# Patient Record
Sex: MALE | Race: WHITE | NOT HISPANIC OR LATINO | Employment: FULL TIME | URBAN - METROPOLITAN AREA
[De-identification: names, ages, dates, MRNs, and addresses within clinical notes are randomized per-mention and may not be internally consistent; named-entity substitution may affect disease eponyms.]

---

## 2017-02-09 ENCOUNTER — OFFICE VISIT - HEALTHEAST (OUTPATIENT)
Dept: FAMILY MEDICINE | Facility: CLINIC | Age: 42
End: 2017-02-09

## 2017-02-09 ENCOUNTER — RECORDS - HEALTHEAST (OUTPATIENT)
Dept: GENERAL RADIOLOGY | Facility: CLINIC | Age: 42
End: 2017-02-09

## 2017-02-09 DIAGNOSIS — M25.522 LEFT ELBOW PAIN: ICD-10-CM

## 2017-02-09 DIAGNOSIS — M62.838 MUSCLE SPASM OF LEFT SHOULDER: ICD-10-CM

## 2017-02-09 DIAGNOSIS — M25.522 PAIN IN LEFT ELBOW: ICD-10-CM

## 2017-05-11 ENCOUNTER — COMMUNICATION - HEALTHEAST (OUTPATIENT)
Dept: FAMILY MEDICINE | Facility: CLINIC | Age: 42
End: 2017-05-11

## 2017-05-19 ENCOUNTER — COMMUNICATION - HEALTHEAST (OUTPATIENT)
Dept: FAMILY MEDICINE | Facility: CLINIC | Age: 42
End: 2017-05-19

## 2017-07-21 ENCOUNTER — COMMUNICATION - HEALTHEAST (OUTPATIENT)
Dept: FAMILY MEDICINE | Facility: CLINIC | Age: 42
End: 2017-07-21

## 2017-07-28 ENCOUNTER — COMMUNICATION - HEALTHEAST (OUTPATIENT)
Dept: FAMILY MEDICINE | Facility: CLINIC | Age: 42
End: 2017-07-28

## 2019-02-15 ENCOUNTER — OFFICE VISIT (OUTPATIENT)
Dept: FAMILY MEDICINE | Facility: CLINIC | Age: 44
End: 2019-02-15
Payer: COMMERCIAL

## 2019-02-15 VITALS
HEART RATE: 79 BPM | WEIGHT: 315 LBS | OXYGEN SATURATION: 94 % | HEIGHT: 77 IN | SYSTOLIC BLOOD PRESSURE: 126 MMHG | BODY MASS INDEX: 37.19 KG/M2 | TEMPERATURE: 98 F | RESPIRATION RATE: 18 BRPM | DIASTOLIC BLOOD PRESSURE: 88 MMHG

## 2019-02-15 DIAGNOSIS — Z11.4 SCREENING FOR HIV (HUMAN IMMUNODEFICIENCY VIRUS): ICD-10-CM

## 2019-02-15 DIAGNOSIS — Z23 NEED FOR PROPHYLACTIC VACCINATION AND INOCULATION AGAINST INFLUENZA: ICD-10-CM

## 2019-02-15 DIAGNOSIS — Z23 NEED FOR TETANUS, DIPHTHERIA, AND ACELLULAR PERTUSSIS (TDAP) VACCINE: ICD-10-CM

## 2019-02-15 DIAGNOSIS — Z00.00 ROUTINE GENERAL MEDICAL EXAMINATION AT A HEALTH CARE FACILITY: Primary | ICD-10-CM

## 2019-02-15 DIAGNOSIS — R06.83 SNORING: ICD-10-CM

## 2019-02-15 LAB
ANION GAP SERPL CALCULATED.3IONS-SCNC: 5 MMOL/L (ref 3–14)
BUN SERPL-MCNC: 19 MG/DL (ref 7–30)
CALCIUM SERPL-MCNC: 9.1 MG/DL (ref 8.5–10.1)
CHLORIDE SERPL-SCNC: 105 MMOL/L (ref 94–109)
CHOLEST SERPL-MCNC: 253 MG/DL
CO2 SERPL-SCNC: 26 MMOL/L (ref 20–32)
CREAT SERPL-MCNC: 0.82 MG/DL (ref 0.66–1.25)
GFR SERPL CREATININE-BSD FRML MDRD: >90 ML/MIN/{1.73_M2}
GLUCOSE SERPL-MCNC: 83 MG/DL (ref 70–99)
HDLC SERPL-MCNC: 46 MG/DL
LDLC SERPL CALC-MCNC: 179 MG/DL
NONHDLC SERPL-MCNC: 207 MG/DL
POTASSIUM SERPL-SCNC: 3.6 MMOL/L (ref 3.4–5.3)
SODIUM SERPL-SCNC: 136 MMOL/L (ref 133–144)
TRIGL SERPL-MCNC: 142 MG/DL

## 2019-02-15 PROCEDURE — 80048 BASIC METABOLIC PNL TOTAL CA: CPT | Performed by: NURSE PRACTITIONER

## 2019-02-15 PROCEDURE — 99396 PREV VISIT EST AGE 40-64: CPT | Performed by: NURSE PRACTITIONER

## 2019-02-15 PROCEDURE — 87389 HIV-1 AG W/HIV-1&-2 AB AG IA: CPT | Performed by: NURSE PRACTITIONER

## 2019-02-15 PROCEDURE — 90471 IMMUNIZATION ADMIN: CPT | Performed by: NURSE PRACTITIONER

## 2019-02-15 PROCEDURE — 36415 COLL VENOUS BLD VENIPUNCTURE: CPT | Performed by: NURSE PRACTITIONER

## 2019-02-15 PROCEDURE — 80061 LIPID PANEL: CPT | Performed by: NURSE PRACTITIONER

## 2019-02-15 PROCEDURE — 90715 TDAP VACCINE 7 YRS/> IM: CPT | Performed by: NURSE PRACTITIONER

## 2019-02-15 ASSESSMENT — MIFFLIN-ST. JEOR: SCORE: 2464.46

## 2019-02-15 NOTE — PROGRESS NOTES
"SUBJECTIVE:   CC: Juan Alberto Menchaca is an 44 year old male who presents for preventative health visit.     Physical     {Add if <65 person on Medicare  - Required Questions (Optional):724357}      {additional problems to add (Optional):630809}    Today's PHQ-2 Score:   PHQ-2 ( 1999 Pfizer) 2/15/2019   Q1: Little interest or pleasure in doing things 0   Q2: Feeling down, depressed or hopeless 0   PHQ-2 Score 0   PHQ-2 Score Incomplete       Abuse: Current or Past(Physical, Sexual or Emotional)- No  Do you feel safe in your environment? Yes    Social History     Tobacco Use     Smoking status: Former Smoker     Smokeless tobacco: Never Used   Substance Use Topics     Alcohol use: Yes     No flowsheet data found.{add AUDIT responses (Optional) (A score of 7 for adult men is an indication of hazardous drinking; a score of 8 or more is an indication of an alcohol use disorder.  A score of 7 or more for adult women is an indication of hazardous drinking or an alchohol use disorder):258503}    Last PSA: No results found for: PSA    Reviewed orders with patient. Reviewed health maintenance and updated orders accordingly - Yes  Current Outpatient Medications   Medication Sig Dispense Refill     ranitidine (ZANTAC) 150 MG tablet Take 150 mg by mouth At Bedtime       NO ACTIVE MEDICATIONS        No Known Allergies    Reviewed and updated as needed this visit by clinical staff  Tobacco  Allergies  Med Hx  Surg Hx  Fam Hx  Soc Hx        Reviewed and updated as needed this visit by Provider        {HISTORY OPTIONS (Optional):072910}    Review of Systems  {MALE ROS (Optional):093369::\"CONSTITUTIONAL: NEGATIVE for fever, chills, change in weight\",\"INTEGUMENTARY/SKIN: NEGATIVE for worrisome rashes, moles or lesions\",\"EYES: NEGATIVE for vision changes or irritation\",\"ENT: NEGATIVE for ear, mouth and throat problems\",\"RESP: NEGATIVE for significant cough or SOB\",\"CV: NEGATIVE for chest pain, palpitations or peripheral edema\",\"GI: " "NEGATIVE for nausea, abdominal pain, heartburn, or change in bowel habits\",\" male: negative for dysuria, hematuria, decreased urinary stream, erectile dysfunction, urethral discharge\",\"MUSCULOSKELETAL: NEGATIVE for significant arthralgias or myalgia\",\"NEURO: NEGATIVE for weakness, dizziness or paresthesias\",\"PSYCHIATRIC: NEGATIVE for changes in mood or affect\"}    OBJECTIVE:   /88 (BP Location: Right arm, Patient Position: Sitting, Cuff Size: Adult Large)   Pulse 79   Temp 98  F (36.7  C) (Oral)   Resp 18   Ht 1.949 m (6' 4.75\")   Wt 145.6 kg (321 lb)   SpO2 94%   BMI 38.31 kg/m      Physical Exam  {Exam Choices (Optional):257527}    Diagnostic Test Results:  none     ASSESSMENT/PLAN:   {Diag Picklist:567717}    COUNSELING:   {MALE COUNSELING MESSAGES:728357::\"Reviewed preventive health counseling, as reflected in patient instructions\"}    BP Readings from Last 1 Encounters:   02/15/19 126/88     Estimated body mass index is 38.31 kg/m  as calculated from the following:    Height as of this encounter: 1.949 m (6' 4.75\").    Weight as of this encounter: 145.6 kg (321 lb).    {BP Counseling- Complete if BP >= 120/80  (Optional):368549}  {Weight Management Plan (ACO) Complete if BMI is abnormal-  Ages 18-64  BMI >24.9.  Age 65+ with BMI <23 or >30 (Optional):004243}     reports that he has quit smoking. he has never used smokeless tobacco.  {Tobacco Cessation -- Complete if patient is a smoker (Optional):876839}    Counseling Resources:  ATP IV Guidelines  Pooled Cohorts Equation Calculator  FRAX Risk Assessment  ICSI Preventive Guidelines  Dietary Guidelines for Americans, 2010  USDA's MyPlate  ASA Prophylaxis  Lung CA Screening    YORDY Anand [facility]  "

## 2019-02-15 NOTE — PROGRESS NOTES
"  SUBJECTIVE:   CC: Juan Alberto Menchaca is an 44 year old male who presents for preventive health visit.     Healthy Habits:    Do you get at least three servings of calcium containing foods daily (dairy, green leafy vegetables, etc.)? { :388527::\"yes\"}    Amount of exercise or daily activities, outside of work: { :906546}    Problems taking medications regularly { :385411::\"No\"}    Medication side effects: { :081119::\"No\"}    Have you had an eye exam in the past two years? { :650735}    Do you see a dentist twice per year? { :051967}    Do you have sleep apnea, excessive snoring or daytime drowsiness?{ :111527}  {Outside tests to abstract? :022532}    {additional problems to add (Optional):037200}    Today's PHQ-2 Score: No flowsheet data found.  {PHQ-2 LOOK IN ASSESSMENTS (Optional) :869688}  Abuse: Current or Past(Physical, Sexual or Emotional)- {YES/NO/NA:722514}  Do you feel safe in your environment? {YES/NO/NA:998263}    Social History     Tobacco Use     Smoking status: Not on file   Substance Use Topics     Alcohol use: Not on file     If you drink alcohol do you typically have >3 drinks per day or >7 drinks per week? {ETOH :401124}                      Last PSA: No results found for: PSA    Reviewed orders with patient. Reviewed health maintenance and updated orders accordingly - {Yes/No:304260::\"Yes\"}  {Chronicprobdata (Optional):481666}    Reviewed and updated as needed this visit by clinical staff         Reviewed and updated as needed this visit by Provider        {HISTORY OPTIONS (Optional):029512}    ROS:  { :913265::\"CONSTITUTIONAL: NEGATIVE for fever, chills, change in weight\",\"INTEGUMENTARY/SKIN: NEGATIVE for worrisome rashes, moles or lesions\",\"EYES: NEGATIVE for vision changes or irritation\",\"ENT: NEGATIVE for ear, mouth and throat problems\",\"RESP: NEGATIVE for significant cough or SOB\",\"CV: NEGATIVE for chest pain, palpitations or peripheral edema\",\"GI: NEGATIVE for nausea, abdominal pain, " "heartburn, or change in bowel habits\",\" male: negative for dysuria, hematuria, decreased urinary stream, erectile dysfunction, urethral discharge\",\"MUSCULOSKELETAL: NEGATIVE for significant arthralgias or myalgia\",\"NEURO: NEGATIVE for weakness, dizziness or paresthesias\",\"PSYCHIATRIC: NEGATIVE for changes in mood or affect\"}    OBJECTIVE:   There were no vitals taken for this visit.  EXAM:  {Exam Choices:710200}    {Diagnostic Test Results (Optional):630209::\"Diagnostic Test Results:\",\"none \"}    ASSESSMENT/PLAN:   {Diag Picklist:996337}    COUNSELING:  {MALE COUNSELING MESSAGES:903814::\"Reviewed preventive health counseling, as reflected in patient instructions\"}    BP Readings from Last 1 Encounters:   04/11/12 121/80     Estimated body mass index is 32.64 kg/m  as calculated from the following:    Height as of 4/11/12: 1.962 m (6' 5.25\").    Weight as of 4/11/12: 125.6 kg (277 lb).    {BP Counseling- Complete if BP >= 120/80  (Optional):482012}  {Weight Management Plan (ACO) Complete if BMI is abnormal-  Ages 18-64  BMI >24.9.  Age 65+ with BMI <23 or >30 (Optional):911050}     has no tobacco history on file.  {Tobacco Cessation -- Complete if patient is a smoker (Optional):624471}    Counseling Resources:  ATP IV Guidelines  Pooled Cohorts Equation Calculator  FRAX Risk Assessment  ICSI Preventive Guidelines  Dietary Guidelines for Americans, 2010  USDA's MyPlate  ASA Prophylaxis  Lung CA Screening    YORDY Anand [facility]  "

## 2019-02-15 NOTE — PROGRESS NOTES
NEW PATIENT   SUBJECTIVE:   CC: Juan Alberto Menchaca is an 44 year old male who presents for preventive health visit.     Healthy Habits:    Do you get at least three servings of calcium containing foods daily (dairy, green leafy vegetables, etc.)? yes and no, taking calcium and/or vitamin D supplement: no    Amount of exercise or daily activities, outside of work: no    Problems taking medications regularly No    Medication side effects: No    Have you had an eye exam in the past two years? no    Do you see a dentist twice per year? Once a year    Do you have sleep apnea, excessive snoring or daytime drowsiness?yes    His fiance has been telling him he has periods at night where it sounds like he is trying to catch his breath.  His snoring has gotten to be really loud.  He wakes up feeling unrested.      Taking zantac once daily for GERD.  This manages symptoms well.     He he is engaged and he and his fiance have a 10 year old daughter.  He works for the [employer] as a .  Has one walking route that requires him to walk 13 miles.  Does this route once a week, other routes are more driving and walking short distances.  He reports his diet could be better.  He only eats one meal a day due to work schedule.  Doesn't eat during work day.  Eats one large meal in the evening.  Doesn't drink soda or any sugary drinks.      Previous care at [facility].  Doctor retired and now would like to transfer care here.      Today's PHQ-2 Score:   PHQ-2 ( 1999 Pfizer) 2/15/2019   Q1: Little interest or pleasure in doing things 0   Q2: Feeling down, depressed or hopeless 0   PHQ-2 Score 0   PHQ-2 Score Incomplete       Abuse: Current or Past(Physical, Sexual or Emotional)- No  Do you feel safe in your environment? Yes    Social History     Tobacco Use     Smoking status: Former Smoker     Smokeless tobacco: Never Used   Substance Use Topics     Alcohol use: Yes     If you drink alcohol do you typically have >3 drinks per  "day or >7 drinks per week? No                      Last PSA: No results found for: PSA    Reviewed orders with patient. Reviewed health maintenance and updated orders accordingly - Yes  BP Readings from Last 3 Encounters:   02/15/19 126/88   04/11/12 121/80    Wt Readings from Last 3 Encounters:   02/15/19 145.6 kg (321 lb)   04/11/12 125.6 kg (277 lb)                  Current Outpatient Medications   Medication Sig Dispense Refill     ranitidine (ZANTAC) 150 MG tablet Take 150 mg by mouth At Bedtime       No Known Allergies    Reviewed and updated as needed this visit by clinical staff  Tobacco  Allergies  Meds  Med Hx  Surg Hx  Fam Hx  Soc Hx        Reviewed and updated as needed this visit by Provider  Allergies  Meds        History reviewed. No pertinent past medical history.   Past Surgical History:   Procedure Laterality Date     AS KNEE SCOPE, DIAGNOSTIC         ROS:  CONSTITUTIONAL: NEGATIVE for fever, chills, change in weight  INTEGUMENTARY/SKIN: NEGATIVE for worrisome rashes, moles or lesions  EYES: NEGATIVE for vision changes or irritation  ENT: NEGATIVE for ear, mouth and throat problems  RESP: NEGATIVE for significant cough or SOB  CV: NEGATIVE for chest pain, palpitations or peripheral edema  GI: NEGATIVE for nausea, abdominal pain, heartburn, or change in bowel habits   male: negative for dysuria, hematuria, decreased urinary stream, erectile dysfunction, urethral discharge  MUSCULOSKELETAL: NEGATIVE for significant arthralgias or myalgia  NEURO: NEGATIVE for weakness, dizziness or paresthesias  PSYCHIATRIC: NEGATIVE for changes in mood or affect    OBJECTIVE:   /88 (BP Location: Right arm, Patient Position: Sitting, Cuff Size: Adult Large)   Pulse 79   Temp 98  F (36.7  C) (Oral)   Resp 18   Ht 1.949 m (6' 4.75\")   Wt 145.6 kg (321 lb)   SpO2 94%   BMI 38.31 kg/m    EXAM:  GENERAL: healthy, alert and no distress  EYES: Eyes grossly normal to inspection, PERRL and conjunctivae " "and sclerae normal  HENT: ear canals and TM's normal, nose and mouth without ulcers or lesions  NECK: no adenopathy, no asymmetry, masses, or scars and thyroid normal to palpation  RESP: lungs clear to auscultation - no rales, rhonchi or wheezes  CV: regular rate and rhythm, normal S1 S2, no S3 or S4, no murmur, click or rub, no peripheral edema and peripheral pulses strong  ABDOMEN: soft, nontender, no hepatosplenomegaly, no masses and bowel sounds normal  MS: no gross musculoskeletal defects noted, no edema  SKIN: no suspicious lesions or rashes  NEURO: Normal strength and tone, mentation intact and speech normal  PSYCH: mentation appears normal, affect normal/bright    Diagnostic Test Results:  none     ASSESSMENT/PLAN:   1. Routine general medical examination at a health care facility  Normal exam today; he is fasting for labs.   - Lipid panel reflex to direct LDL Fasting  - Basic metabolic panel    2. Screening for HIV (human immunodeficiency virus)  Discussed; agrees to do today.   - HIV Screening    3. Need for prophylactic vaccination and inoculation against influenza  Declines    4. Snoring  - SLEEP EVALUATION & MANAGEMENT REFERRAL - [facility]  [phone] (Age 18 and up); Future    COUNSELING:  Reviewed preventive health counseling, as reflected in patient instructions       Regular exercise       Healthy diet/nutrition       Immunizations    Vaccinated for: TDAP      BP Readings from Last 1 Encounters:   02/15/19 126/88     Estimated body mass index is 38.31 kg/m  as calculated from the following:    Height as of this encounter: 1.949 m (6' 4.75\").    Weight as of this encounter: 145.6 kg (321 lb).    BP Screening:   Last 3 BP Readings:    BP Readings from Last 3 Encounters:   02/15/19 126/88   04/11/12 121/80       The following was recommended to the patient:  Re-screen BP within a year and recommended lifestyle modifications  Weight management plan: Discussed healthy " diet and exercise guidelines     reports that he has quit smoking. he has never used smokeless tobacco.      Counseling Resources:  ATP IV Guidelines  Pooled Cohorts Equation Calculator  FRAX Risk Assessment  ICSI Preventive Guidelines  Dietary Guidelines for Americans, 2010  USDA's MyPlate  ASA Prophylaxis  Lung CA Screening    YORDY Anand [facility]

## 2019-02-15 NOTE — PROGRESS NOTES
"SUBJECTIVE:   CC: Juan Alberto Menchaca is an 44 year old male who presents for preventative health visit.     Physical     PHQ-2 Total Score: 0    {Add if <65 person on Medicare  - Required Questions (Optional):651338}  {Outside tests to abstract? :490702}    {additional problems to add (Optional):331302}    Today's PHQ-2 Score:   PHQ-2 ( 1999 Pfizer) 2/15/2019   Q1: Little interest or pleasure in doing things 0   Q2: Feeling down, depressed or hopeless 0   PHQ-2 Score 0   PHQ-2 Score Incomplete       Abuse: Current or Past(Physical, Sexual or Emotional)- {YES/NO/NA:431056}  Do you feel safe in your environment? {YES/NO/NA:701680}    Social History     Tobacco Use     Smoking status: Former Smoker     Smokeless tobacco: Never Used   Substance Use Topics     Alcohol use: Yes     No flowsheet data found.{add AUDIT responses (Optional) (A score of 7 for adult men is an indication of hazardous drinking; a score of 8 or more is an indication of an alcohol use disorder.  A score of 7 or more for adult women is an indication of hazardous drinking or an alchohol use disorder):602012}    Last PSA: No results found for: PSA    Reviewed orders with patient. Reviewed health maintenance and updated orders accordingly - {Yes/No:028071::\"Yes\"}  {Chronicprobdata (Optional):779907}    Reviewed and updated as needed this visit by clinical staff  Tobacco  Allergies  Med Hx  Surg Hx  Fam Hx  Soc Hx        Reviewed and updated as needed this visit by Provider        {HISTORY OPTIONS (Optional):813474}    Review of Systems  {MALE ROS (Optional):568518::\"CONSTITUTIONAL: NEGATIVE for fever, chills, change in weight\",\"INTEGUMENTARY/SKIN: NEGATIVE for worrisome rashes, moles or lesions\",\"EYES: NEGATIVE for vision changes or irritation\",\"ENT: NEGATIVE for ear, mouth and throat problems\",\"RESP: NEGATIVE for significant cough or SOB\",\"CV: NEGATIVE for chest pain, palpitations or peripheral edema\",\"GI: NEGATIVE for nausea, abdominal pain, " "heartburn, or change in bowel habits\",\" male: negative for dysuria, hematuria, decreased urinary stream, erectile dysfunction, urethral discharge\",\"MUSCULOSKELETAL: NEGATIVE for significant arthralgias or myalgia\",\"NEURO: NEGATIVE for weakness, dizziness or paresthesias\",\"PSYCHIATRIC: NEGATIVE for changes in mood or affect\"}    OBJECTIVE:   /88 (BP Location: Right arm, Patient Position: Sitting, Cuff Size: Adult Large)   Pulse 79   Temp 98  F (36.7  C) (Oral)   Resp 18   Ht 1.949 m (6' 4.75\")   Wt 145.6 kg (321 lb)   SpO2 94%   BMI 38.31 kg/m      Physical Exam  {Exam Choices (Optional):007227}    {Diagnostic Test Results (Optional):939985::\"Diagnostic Test Results:\",\"none \"}    ASSESSMENT/PLAN:   {Diag Picklist:657325}    COUNSELING:   {MALE COUNSELING MESSAGES:496623::\"Reviewed preventive health counseling, as reflected in patient instructions\"}    BP Readings from Last 1 Encounters:   02/15/19 126/88     Estimated body mass index is 38.31 kg/m  as calculated from the following:    Height as of this encounter: 1.949 m (6' 4.75\").    Weight as of this encounter: 145.6 kg (321 lb).    {BP Counseling- Complete if BP >= 120/80  (Optional):026729}  {Weight Management Plan (ACO) Complete if BMI is abnormal-  Ages 18-64  BMI >24.9.  Age 65+ with BMI <23 or >30 (Optional):633658}     reports that he has quit smoking. he has never used smokeless tobacco.  {Tobacco Cessation -- Complete if patient is a smoker (Optional):737599}    Counseling Resources:  ATP IV Guidelines  Pooled Cohorts Equation Calculator  FRAX Risk Assessment  ICSI Preventive Guidelines  Dietary Guidelines for Americans, 2010  USDA's MyPlate  ASA Prophylaxis  Lung CA Screening    YORDY Anand [facility]  "

## 2019-02-17 LAB — HIV 1+2 AB+HIV1 P24 AG SERPL QL IA: NONREACTIVE

## 2019-02-19 ENCOUNTER — MYC MEDICAL ADVICE (OUTPATIENT)
Dept: FAMILY MEDICINE | Facility: CLINIC | Age: 44
End: 2019-02-19

## 2019-02-19 ENCOUNTER — OFFICE VISIT (OUTPATIENT)
Dept: SLEEP MEDICINE | Facility: CLINIC | Age: 44
End: 2019-02-19
Attending: NURSE PRACTITIONER
Payer: COMMERCIAL

## 2019-02-19 VITALS
WEIGHT: 315 LBS | OXYGEN SATURATION: 98 % | BODY MASS INDEX: 37.19 KG/M2 | SYSTOLIC BLOOD PRESSURE: 129 MMHG | DIASTOLIC BLOOD PRESSURE: 88 MMHG | HEIGHT: 77 IN | HEART RATE: 113 BPM | RESPIRATION RATE: 16 BRPM

## 2019-02-19 DIAGNOSIS — G47.30 OBSERVED SLEEP APNEA: Primary | ICD-10-CM

## 2019-02-19 DIAGNOSIS — R06.83 SNORING: ICD-10-CM

## 2019-02-19 PROCEDURE — 99204 OFFICE O/P NEW MOD 45 MIN: CPT | Performed by: PHYSICIAN ASSISTANT

## 2019-02-19 ASSESSMENT — MIFFLIN-ST. JEOR: SCORE: 2455.38

## 2019-02-19 NOTE — NURSING NOTE
"Chief Complaint   Patient presents with     Sleep Problem     Gasping for breath at night, snoring.       Initial /88   Pulse 113   Resp 16   Ht 1.949 m (6' 4.75\")   Wt 144.7 kg (319 lb)   SpO2 98%   BMI 38.07 kg/m   Estimated body mass index is 38.07 kg/m  as calculated from the following:    Height as of this encounter: 1.949 m (6' 4.75\").    Weight as of this encounter: 144.7 kg (319 lb).    Medication Reconciliation: complete     ESS 11  Neck 47cm  Barb Blanco        "

## 2019-02-19 NOTE — PROGRESS NOTES
Sleep Consultation:    Date on this visit: 2/19/2019    Juan Alberto Menchaca is sent by Chrissie Cohen for a sleep consultation regarding snoring.    Primary Physician: Sravanthi Daniel     Juan Alberto Menhcaca reports snoring and observed periods where it looks like he is trying to catch his breath during sleep within the last year. His medical history is non-contributory.    Juan Alberto goes to sleep at 10:00 PM during the week. He wakes up at 5:30 AM with an alarm, no snooze. He falls asleep in 5-10 minutes.  Juan Alberto denies difficulty falling asleep.  He does not usually wake in the night.  On weekends, Juna Alberto goes to sleep at 10:00 PM.  He wakes up at 8:00-9:00 AM without an alarm. By the end of the night, he is tossing and turning. He falls asleep in 5-10 minutes.  Patient gets an average of 7-9 hours of sleep per night. He feels unrefreshed by his sleep.    Patient does use electronics in bed (15-20 min) and watch TV in bed (not much now) and does not worry in bed about anything and read in bed.     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  He works day/evening shifts. He sometimes does not get home from work until 8 PM. He gets Sundays off and one other day per week. Then every 6 weeks, he gets 3 days off (Fri-Sun). He works for the [employer].  He lives with his fiance and their 10 year old daughter.    Juan Alberto does snore every night and snoring is loud. Patient does have a regular bed partner. There is report of gasping/choking.  He does have witnessed apneas. They occasionally sleep separately due to his snoring.  Patient sleeps on his side. He has occasional morning dry mouth, denies snort arousals, morning headaches, morning confusion and restless legs. Juan Alberto denies any bruxism, sleep walking, sleep talking, dream enactment, sleep paralysis, cataplexy and hypnogogic/hypnopompic hallucinations. He is told that he moves his legs a lot in his sleep.    Juan Alberto has reflux at night and heartburn (takes Zantac to prevent waking  with reflux, has woken basically vomiting), denies difficulty breathing through his nose, claustrophobia and depression.      Juan Alberto has gained 40-45 pounds in 7 years.  Patient describes themself as a morning person. He used to be a night person before getting this job (6 years ago). He used to sleep 2-3 AM to 8-10 AM.  Patient's [place] Sleepiness score 11/24 consistent with mild daytime sleepiness.      Juan Alberto does not take naps. He takes some inadvertant naps on break at work after lunch if he worked late the day before.  He denies dozing while driving.  Patient was counseled on the importance of driving while alert, to pull over if drowsy, or nap before getting into the vehicle if sleepy.  He uses 3 energy drinks/week. Last caffeine intake is usually before 3 PM.    Allergies:    No Known Allergies    Medications:    Current Outpatient Medications   Medication Sig Dispense Refill     ranitidine (ZANTAC) 150 MG tablet Take 150 mg by mouth At Bedtime         Problem List:  There are no active problems to display for this patient.       Past Medical/Surgical History:  Past Medical History:   Diagnosis Date     Acid reflux      Past Surgical History:   Procedure Laterality Date     AS KNEE SCOPE, DIAGNOSTIC         Social History:  Social History     Socioeconomic History     Marital status: Single     Spouse name: Not on file     Number of children: Not on file     Years of education: Not on file     Highest education level: Not on file   Social Needs     Financial resource strain: Not on file     Food insecurity - worry: Not on file     Food insecurity - inability: Not on file     Transportation needs - medical: Not on file     Transportation needs - non-medical: Not on file   Occupational History     Not on file   Tobacco Use     Smoking status: Former Smoker     Packs/day: 1.00     Years: 17.00     Pack years: 17.00     Types: Cigarettes     Smokeless tobacco: Never Used     Tobacco comment: quit in about 2007,  1-1.5 ppd   Substance and Sexual Activity     Alcohol use: Yes     Comment: 2 per night after work on most nights     Drug use: No     Sexual activity: Yes     Partners: Female   Other Topics Concern     Parent/sibling w/ CABG, MI or angioplasty before 65F 55M? Not Asked   Social History Narrative     Not on file       Family History:  Family History   Problem Relation Age of Onset     Diabetes Father      Hypertension Father      Hyperlipidemia Father      Sleep Apnea Father      Coronary Artery Disease Maternal Grandfather      Lung Cancer Paternal Grandmother      Anuerysm Paternal Grandfather        Review of Systems:  A complete review of systems reviewed by me is negative with the exeption of what has been mentioned in the history of present illness.  CONSTITUTIONAL: NEGATIVE for weight gain/loss, fever, chills, sweats or night sweats, drug allergies.  EYES: NEGATIVE for changes in vision, blind spots, double vision.  ENT: NEGATIVE for ear pain, sore throat, sinus pain, post-nasal drip, runny nose, bloody nose  CARDIAC: NEGATIVE for fast heartbeats or fluttering in chest, chest pain or pressure, breathlessness when lying flat, swollen legs or swollen feet.  NEUROLOGIC: NEGATIVE headaches, weakness or numbness in the arms or legs.  DERMATOLOGIC: NEGATIVE for rashes, new moles or change in mole(s)  PULMONARY: NEGATIVE SOB at rest, dry cough, productive cough, coughing up blood, wheezing or whistling when breathing.    PULMONARY:  POSITIVE for  SOB with activity  GASTROINTESTINAL: NEGATIVE for nausea or vomitting, loose or watery stools, fat or grease in stools, constipation, abdominal pain, bowel movements black in color or blood noted.  GENITOURINARY: NEGATIVE for pain during urination, blood in urine, urinating more frequently than usual, irregular menstrual periods.  MUSCULOSKELETAL: NEGATIVE for muscle pain, bone or joint pain, swollen joints.  ENDOCRINE: NEGATIVE for increased thirst or urination,  "diabetes.  LYMPHATIC: NEGATIVE for swollen lymph nodes, lumps or bumps in the breasts or nipple discharge.    Physical Examination:  Vitals: /88   Pulse 113   Resp 16   Ht 1.949 m (6' 4.75\")   Wt 144.7 kg (319 lb)   SpO2 98%   BMI 38.07 kg/m      Neck Cir (cm): 47 cm    Tarzan Total Score 2/19/2019   Total score - Tarzan 11       ANGEL Total Score: 8 (02/19/19 1349)    GENERAL APPEARANCE: healthy, alert, no distress and cooperative  EYES: Eyes grossly normal to inspection, PERRL, conjunctivae and sclerae normal and lids and lashes normal  HENT: nose and mouth without ulcers or lesions, oral mucous membranes moist, oropharynx clear and upper dentures  NECK: no adenopathy, no asymmetry, masses, or scars, thyroid normal to palpation and trachea midline and normal to palpation  RESP: lungs clear to auscultation - no rales, rhonchi or wheezes  CV: regular rates and rhythm, normal S1 S2, no S3 or S4, no murmur, click or rub and no irregular beats  LYMPHATICS: no cervical adenopathy  MS: extremities normal- no gross deformities noted  NEURO: Normal strength and tone, mentation intact, speech normal and cranial nerves 2-12 intact  Mallampati Class: II.  Tonsillar Stage: 1  hidden by pillars.    Impression/Plan:    (G47.30) Observed sleep apnea  (primary encounter diagnosis), (R06.83) Snoring  Comment: Mr. Menchaca presents with concerns of sleep apnea. His girlfriend has started sleeping in another room because his snoring has gotten louder. She has observed occasional gasping/choking in his sleep. His risk for HARIS is high. He often does not feel very refreshed by his sleep and will sometimes doze while sitting after his lunch break.  STOP BANG TOTAL: 6 snoring, sleepiness (ESS 11/24), observed apnea, BMI >35 (38), neck circumference >40 cm (47 cm), male gender. Father has HARIS. Negative risk factors include; no HTN, age <50 (44). He has a couple of beers most nights.   Plan: HST-Home Sleep Apnea Test        "     Literature provided regarding sleep apnea.      He will follow up with me in approximately two weeks after his sleep study has been competed to review the results and discuss plan of care.       Polysomnography reviewed.  Obstructive sleep apnea reviewed.  Complications of untreated sleep apnea were reviewed.  45 minutes was spent during this visit, over 50% in counseling and coordination of care.   Bennett Goltz, PA-C    CC: YORDY Rankin CNP

## 2019-02-19 NOTE — PATIENT INSTRUCTIONS
"MY TREATMENT INFORMATION FOR SLEEP APNEA-  Juan Alberto Menchaca    DOCTOR : Bennett Ezra Goltz, PA-C  SLEEP CENTER : [facility]     MY CONTACT NUMBER: [phone]    Am I having a sleep study at a sleep center?  Make sure you have an appointment for the study before you leave!    Am I having a home sleep study?  Watch this video:  https://[email or web address].com/watch?v=CteI_[email or web address]&[email or web address]  Please verify your insurance coverage with your insurance carrier    Frequently asked questions:  1. What is Obstructive Sleep Apnea (HARIS)? HARIS is the most common type of sleep apnea. Apnea means, \"without breath.\"  Apnea is most often caused by narrowing or collapse of the upper airway as muscles relax during sleep.   Almost everyone has occasional apneas. Most people with sleep apnea have had brief interruptions at night frequently for many years.  The severity of sleep apnea is related to how frequent and severe the events are.   2. What are the consequences of HARIS? Symptoms include: feeling sleepy during the day, snoring loudly, gasping or stopping of breathing, trouble sleeping, and occasionally morning headaches or heartburn at night.  Sleepiness can be serious and even increase the risk of falling asleep while driving. Other health consequences may include development of high blood pressure and other cardiovascular disease in persons who are susceptible. Untreated HARIS  can contribute to heart disease, stroke and diabetes.   3. What are the treatment options? In most situations, sleep apnea is a lifelong disease that must be managed with daily therapy. Medications are not effective for sleep apnea and surgery is generally not considered until other therapies have been tried. Your treatment is your choice . Continuous Positive Airway (CPAP) works right away and is the therapy that is effective in nearly everyone. An oral device to hold your jaw forward is usually the next most reliable option. Other options " include postioning devices (to keep you off your back), weight loss, and surgery including a tongue pacing device. There is more detail about some of these options below.    Important tips for using CPAP and similar devices   Know your equipment:  CPAP is continuous positive airway pressure that prevents obstructive sleep apnea by keeping the throat from collapsing while you are sleeping. In most cases, the device is  smart  and can slowly self-adjusts if your throat collapses and keeps a record every day of how well you are treated-this information is available to you and your care team.  BPAP is bilevel positive airway pressure that keeps your throat open and also assists each breath with a pressure boost to maintain adequate breathing.  Special kinds of BPAP are used in patients who have inadequate breathing from lung or heart disease. In most cases, the device is  smart  and can slowly self-adjusts to assist breathing. Like CPAP, the device keeps a record of how well you are treated.  Your mask is your connection to the device. You get to choose what feels most comfortable and the staff will help to make sure if fits. Here: are some examples of the different masks that are available:       Key points to remember on your journey with sleep apnea:  1. Sleep study.  PAP devices often need to be adjusted during a sleep study to show that they are effective and adjusted right.  2. Good tips to remember: Try wearing just the mask during a quiet time during the day so your body adapts to wearing it. A humidifier is recommended for comfort in most cases to prevent drying of your nose and throat. Allergy medication from your provider may help you if you are having nasal congestion.  3. Getting settled-in. It takes more than one night for most of us to get used to wearing a mask. Try wearing just the mask during a quiet time during the day so your body adapts to wearing it. A humidifier is recommended for comfort in most  cases. Our team will work with you carefully on the first day and will be in contact within 4 days and again at 2 and 4 weeks for advice and remote device adjustments. Your therapy is evaluated by the device each day.   4. Use it every night. The more you are able to sleep naturally for 7-8 hours, the more likely you will have good sleep and to prevent health risks or symptoms from sleep apnea. Even if you use it 4 hours it helps. Occasionally all of us are unable to use a medical therapy, in sleep apnea, it is not dangerous to miss one night.   5. Communicate. Call our skilled team on the number provided on the first day if your visit for problems that make it difficult to wear the device. Over 2 out of 3 patients can learn to wear the device long-term with help from our team. Remember to call our team or your sleep providers if you are unable to wear the device as we may have other solutions for those who cannot adapt to mask CPAP therapy. It is recommended that you sleep your sleep provider within the first 3 months and yearly after that if you are not having problems.   Take care of your equipment. Make sure you clean your mask and tubing using directions every day and that your filter and mask are replaced as recommended or if they are not working.     BESIDES CPAP, WHAT OTHER THERAPIES ARE THERE?  Positioning Device  Positioning devices are generally used when sleep apnea is mild and only occurs on your back.This example shows a pillow that straps around the waist. It may be appropriate for those whose sleep study shows milder sleep apnea that occurs primarily when lying flat on one's back. Preliminary studies have shown benefit but effectiveness at home may need to be verified by a home sleep test. These devices are generally not covered by medical insurance.  Examples of devices that maintain sleeping on the back to prevent snoring and mild sleep apnea.    Belt type body  positioner  Http://[employer].Deep-Secure/    Electronic reminder  Http://nightshifttherapy.com/  Http://[email or web address] [employer]andy.com.au/    Oral Appliance  What is oral appliance therapy?  An oral appliance device fits on your teeth at night like a retainer used after having braces. The device is made by a specialized dentist and requires several visits over 1-2 months before a manufactured device is made to fit your teeth and is adjusted to prevent your sleep apnea. Once an oral device is working properly, snoring should be improved. A home sleep test may be recommended at that time if to determine whether the sleep apnea is adequately treated.       Some things to remember:  -Oral devices are often, but not always, covered by your medical insurance. Be sure to check with your insurance provider.   -If you are referred for oral therapy, you will be given a list of specialized dentists to consider or you may choose to visit the Web site of the American Academy of Dental Sleep Medicine  -Oral devices are less likely to work if you have severe sleep apnea or are extremely overweight.     More detailed information  An oral appliance is a small acrylic device that fits over the upper and lower teeth  (similar to a retainer or a mouth guard). This device slightly moves jaw forward, which moves the base of the tongue forward, opens the airway, improves breathing for effective treat snoring and obstructive sleep apnea in perhaps 7 out of 10 people .  The best working devices are custom-made by a dental device  after a mold is made of the teeth 1, 2, 3.  When is an oral appliance indicated?  Oral appliance therapy is recommended as a first-line treatment for patients with primary snoring, mild sleep apnea, and for patients with moderate sleep apnea who prefer appliance therapy to use of CPAP4, 5. Severity of sleep apnea is determined by sleep testing and is based on the number of respiratory events per hour of sleep.   How successful  is oral appliance therapy?  The success rate of oral appliance therapy in patients with mild sleep apnea is 75-80% while in patients with moderate sleep apnea it is 50-70%. The chance of success in patients with severe sleep apnea is 40-50%. The research also shows that oral appliances have a beneficial effect on the cardiovascular health of HARIS patients at the same magnitude as CPAP therapy7.  Oral appliances should be a second-line treatment in cases of severe sleep apnea, but if not completely successful then a combination therapy utilizing CPAP plus oral appliance therapy may be effective. Oral appliances tend to be effective in a broad range of patients although studies show that the patients who have the highest success are females, younger patients, those with milder disease, and less severe obesity. 3, 6.   Finding a dentist that practices dental sleep medicine  Specific training is available through the American Academy of Dental Sleep Medicine for dentists interested in working in the field of sleep. To find a dentist who is educated in the field of sleep and the use of oral appliances, near you, visit the Web site of the American Academy of Dental Sleep Medicine.    References  1. Sha et al. Objectively measured vs self-reported compliance during oral appliance therapy for sleep-disordered breathing. Chest 2013; 144(5): 2603-6160.  2. Francoise et al. Objective measurement of compliance during oral appliance therapy for sleep-disordered breathing. Thorax 2013; 68(1): 91-96.  3. Didier, et al. Mandibular advancement devices in 620 men and women with HARIS and snoring: tolerability and predictors of treatment success. Chest 2004; 125: 5120-6685.  4. Kris, et al. Oral appliances for snoring and HARIS: a review. Sleep 2006; 29: 244-262.  5. Lilia, et al. Oral appliance treatment for HARIS: an update. J Clin Sleep Med 2014; 10(2): 215-227.  6. Kylee et al. Predictors of OSAH treatment  outcome. J [place] Res 2007; 86: 3432-4747.  Weight Loss:    Weight loss is a long-term strategy that may improve sleep apnea in some patients.    Weight management is a personal decision and the decision should be based on your interest and the potential benefits.  If you are interested in exploring weight loss strategies, the following discussion covers the impact on weight loss on sleep apnea and the approaches that may be successful.    Being overweight does not necessarily mean you will have health consequences.  Those who have BMI over 35 or over 27 with existing medical conditions carries greater risk.   Weight loss decreases severity of sleep apnea in most people with obesity. For those with mild obesity who have developed snoring with weight gain, even 15-30 pound weight loss can improve and occasionally eliminate sleep apnea.  Structured and life-long dietary and health habits are necessary to lose weight and keep healthier weight levels.     Though there may be significant health benefits from weight loss, long-term weight loss is very difficult to achieve- studies show success with dietary management in less than 10% of people. In addition, substantial weight loss may require years of dietary control and may be difficult if patients have severe obesity. In these cases, surgical management may be considered.  Finally, older individuals who have tolerated obesity without health complications may be less likely to benefit from weight loss strategies.    Your BMI is Body mass index is 38.07 kg/m .  Weight management is a personal decision.  If you are interested in exploring weight loss strategies, the following discussion covers the approaches that may be successful. Body mass index (BMI) is one way to tell whether you are at a healthy weight, overweight, or obese. It measures your weight in relation to your height.  A BMI of 18.5 to 24.9 is in the healthy range. A person with a BMI of 25 to 29.9 is considered  overweight, and someone with a BMI of 30 or greater is considered obese. More than two-thirds of American adults are considered overweight or obese.  Being overweight or obese increases the risk for further weight gain. Excess weight may lead to heart disease and diabetes.  Creating and following plans for healthy eating and physical activity may help you improve your health.  Weight control is part of healthy lifestyle and includes exercise, emotional health, and healthy eating habits. Careful eating habits lifelong are the mainstay of weight control. Though there are significant health benefits from weight loss, long-term weight loss with diet alone may be very difficult to achieve- studies show long-term success with dietary management in less than 10% of people. Attaining a healthy weight may be especially difficult to achieve in those with severe obesity. In some cases, medications, devices and surgical management might be considered.  What can you do?  If you are overweight or obese and are interested in methods for weight loss, you should discuss this with your provider.     Consider reducing daily calorie intake by 500 calories.     Keep a food journal.     Avoiding skipping meals, consider cutting portions instead.    Diet combined with exercise helps maintain muscle while optimizing fat loss. Strength training is particularly important for building and maintaining muscle mass. Exercise helps reduce stress, increase energy, and improves fitness. Increasing exercise without diet control, however, may not burn enough calories to loose weight.       Start walking three days a week 10-20 minutes at a time    Work towards walking thirty minutes five days a week     Eventually, increase the speed of your walking for 1-2 minutes at time    In addition, we recommend that you review healthy lifestyles and methods for weight loss available through the National Institutes of Health patient information  sites:  http://win.niddk.nih.gov/publications/index.htm    And look into health and wellness programs that may be available through your health insurance provider, employer, local community center, or lindsay club.    Weight management plan: Patient was referred to their PCP to discuss a diet and exercise plan.  Surgery:  Surgery for obstructive sleep apnea is considered generally only when other therapies fail to work. Surgery may be discussed with you if you are having a difficult time tolerating CPAP and or when there is an abnormal structure that requires surgical correction.  Nose and throat surgeries often enlarge the airway to prevent collapse.  Most of these surgeries create pain for 1-2 weeks and up to half of the most common surgeries are not effective throughout life.  You should carefully discuss the benefits and drawbacks to surgery with your sleep provider and surgeon to determine if it is the best solution for you.   More information  Surgery for HARIS is directed at areas that are responsible for narrowing or complete obstruction of the airway during sleep.  There are a wide range of procedures available to enlarge and/or stabilize the airway to prevent blockage of breathing in the three major areas where it can occur: the palate, tongue, and nasal regions.  Successful surgical treatment depends on the accurate identification of the factors responsible for obstructive sleep apnea in each person.  A personalized approach is required because there is no single treatment that works well for everyone.  Because of anatomic variation, consultation with an examination by a sleep surgeon is a critical first step in determining what surgical options are best for each patient.  In some cases, examination during sedation may be recommended in order to guide the selection of procedures.  Patients will be counseled about risks and benefits as well as the typical recovery course after surgery. Surgery is typically not  a cure for a person s HARIS.  However, surgery will often significantly improve one s HARIS severity (termed  success rate ).  Even in the absence of a cure, surgery will decrease the cardiovascular risk associated with OSA7; improve overall quality of life8 (sleepiness, functionality, sleep quality, etc).  Palate Procedures:  Patients with HARIS often have narrowing of their airway in the region of their tonsils and uvula.  The goals of palate procedures are to widen the airway in this region as well as to help the tissues resist collapse.  Modern palate procedure techniques focus on tissue conservation and soft tissue rearrangement, rather than tissue removal.  Often the uvula is preserved in this procedure. Residual sleep apnea is common in patient after pharyngoplasty with an average reduction in sleep apnea events of 33%2.    Tongue Procedures:  ExamWhile patients are awake, the muscles that surround the throat are active and keep this region open for breathing. These muscles relax during sleep, allowing the tongue and other structures to collapse and block breathing.  There are several different tongue procedures available.  Selection of a tongue base procedure depends on characteristics seen on physical exam.  Generally, procedures are aimed at removing bulky tissues in this area or preventing the back of the tongue from falling back during sleep.  Success rates for tongue surgery range from 50-62%3.  Hypoglossal Nerve Stimulation:  Hypoglossal nerve stimulation has recently received approval from the [place] Food and Drug Administration for the treatment of obstructive sleep apnea.  This is based on research showing that the system was safe and effective in treating sleep apnea6.  Results showed that the median AHI score decreased 68%, from 29.3 to 9.0. This therapy uses an implant system that senses breathing patterns and delivers mild stimulation to airway muscles, which keeps the airway open during sleep.   The system consists of three fully implanted components: a small generator (similar in size to a pacemaker), a breathing sensor, and a stimulation lead.  Using a small handheld remote, a patient turns the therapy on before bed and off upon awakening.    Candidates for this device must be greater than 22 years of age, have moderate to severe HARIS (AHI between 20-65), BMI less than 32, have tried CPAP/oral appliance without success, and have appropriate upper airway anatomy (determined by a sleep endoscopy performed by Dr. Suero).  Hypoglossal Nerve Stimulation Pathway:    The sleep surgeon s office will work with the patient through the insurance prior-authorization process (including communications and appeals).    Nasal Procedures:  Nasal obstruction can interfere with nasal breathing during the day and night.  Studies have shown that relief of nasal obstruction can improve the ability of some patients to tolerate positive airway pressure therapy for obstructive sleep apnea1.  Treatment options include medications such as nasal saline, topical corticosteroid and antihistamine sprays, and oral medications such as antihistamines or decongestants. Non-surgical treatments can include external nasal dilators for selected patients. If these are not successful by themselves, surgery can improve the nasal airway either alone or in combination with these other options.  Combination Procedures:  Combination of surgical procedures and other treatments may be recommended, particularly if patients have more than one area of narrowing or persistent positional disease.  The success rate of combination surgery ranges from 66-80%2,3.    References  1. Sadie CALDERA. The Role of the Nose in Snoring and Obstructive Sleep Apnoea: An Update.  Eur Arch Otorhinolaryngol. 2011; 268: 1365-73.  2.  Nini SM; Antonio JA; Sharita JR; Pallanch JF; Liliane COTTRELL; Yonathan BRYAN; Sarah VIDAL. Surgical modifications of the upper airway for obstructive sleep  apnea in adults: a systematic review and meta-analysis. SLEEP 2010;33(10):1459-8062. Teo CAPUTO. Hypopharyngeal surgery in obstructive sleep apnea: an evidence-based medicine review.  Arch Otolaryngol Head Neck Surg. 2006 Feb;132(2):206-13.  3. Blake YH1, Juan Antonio Y, Brandon DILSHAD. The efficacy of anatomically based multilevel surgery for obstructive sleep apnea. Otolaryngol Head Neck Surg. 2003 Oct;129(4):327-35.  4. Kezirian E, Goldberg A. Hypopharyngeal Surgery in Obstructive Sleep Apnea: An Evidence-Based Medicine Review. Arch Otolaryngol Head Neck Surg. 2006 Feb;132(2):206-13.  5. Angel MEI et al. Upper-Airway Stimulation for Obstructive Sleep Apnea.  N Engl J Med. 2014 Jan 9;370(2):139-49.  6. Yonatan Y et al. Increased Incidence of Cardiovascular Disease in Middle-aged Men with Obstructive Sleep Apnea. Am J Respir Crit Care Med; 2002 166: 159-165  7. Jun EM et al. Studying Life Effects and Effectiveness of Palatopharyngoplasty (SLEEP) study: Subjective Outcomes of Isolated Uvulopalatopharyngoplasty. Otolaryngol Head Neck Surg. 2011; 144: 623-631.

## 2019-03-08 ENCOUNTER — COMMUNICATION - HEALTHEAST (OUTPATIENT)
Dept: FAMILY MEDICINE | Facility: CLINIC | Age: 44
End: 2019-03-08

## 2019-03-18 ENCOUNTER — OFFICE VISIT (OUTPATIENT)
Dept: SLEEP MEDICINE | Facility: CLINIC | Age: 44
End: 2019-03-18
Payer: COMMERCIAL

## 2019-03-18 DIAGNOSIS — G47.33 OSA (OBSTRUCTIVE SLEEP APNEA): ICD-10-CM

## 2019-03-18 DIAGNOSIS — R06.83 SNORING: ICD-10-CM

## 2019-03-18 DIAGNOSIS — G47.30 OBSERVED SLEEP APNEA: ICD-10-CM

## 2019-03-18 PROCEDURE — G0399 HOME SLEEP TEST/TYPE 3 PORTA: HCPCS | Performed by: INTERNAL MEDICINE

## 2019-03-19 ENCOUNTER — RECORDS - HEALTHEAST (OUTPATIENT)
Dept: ADMINISTRATIVE | Facility: OTHER | Age: 44
End: 2019-03-19

## 2019-03-19 ENCOUNTER — DOCUMENTATION ONLY (OUTPATIENT)
Dept: SLEEP MEDICINE | Facility: CLINIC | Age: 44
End: 2019-03-19
Payer: COMMERCIAL

## 2019-03-19 NOTE — PROGRESS NOTES
This HSAT was performed using a Noxturnal T3 device which recorded snore, sound, movement activity, body position, nasal pressure, oronasal thermal airflow, pulse, oximetry and both chest and abdominal respiratory effort. HSAT data was restricted to the time patient states they were in bed.     HSAT was scored using 1B 4% hypopnea rule.     AHI: 46.6. Snoring was reported as loud.  Time with SpO2 below 89% was 154.0 minutes.   Overall signal quality was good     Pt will follow up with sleep provider to determine appropriate therapy.     Ordering MD, Goltz, Bennett Ezra, PA-C Charles O. BA, RUST, RST System Clinical Specialist 03/19/2019

## 2019-03-20 PROBLEM — G47.33 OSA (OBSTRUCTIVE SLEEP APNEA): Status: ACTIVE | Noted: 2019-03-20

## 2019-03-20 NOTE — PROCEDURES
"HOME SLEEP STUDY INTERPRETATION    Patient: Juan Alberto Menchaca  MRN: [id number]  YOB: 1975  Study Date: 3/18/2019  Referring Provider: Sravanthi Daniel;   Ordering Provider: Bennett Goltz, PA     Indications for Home Study: Juan Alberto Menchaca is a 44 year old male with a history of no significant medical comorbidity  who presents with symptoms suggestive of obstructive sleep apnea.    Estimated body mass index is 38.07 kg/m  as calculated from the following:    Height as of 19: 1.949 m (6' 4.75\").    Weight as of 19: 144.7 kg (319 lb).  Total score - [place]: 11 (2019  1:49 PM)  STOP-BAN/8    Data: A full night home sleep study was performed recording the standard physiologic parameters including body position, movement, sound, nasal pressure, thermal oral airflow, chest and abdominal movements with respiratory inductance plethysmography, and oxygen saturation by pulse oximetry. Pulse rate was estimated by oximetry recording. This study was considered adequate based on > 4 hours of quality oximetry and respiratory recording. As specified by the AASM Manual for the Scoring of Sleep and Associated events, version 2.3, Rule VIII.D 1B, 4% oxygen desaturation scoring for hypopneas is used as a standard of care on all home sleep apnea testing.    Analysis Time:  484.3 minutes    Respiration:   Sleep Associated Hypoxemia: sustained hypoxemia was present. Baseline oxygen saturation was 90.8%.  Time with saturation less than or equal to 88% was 154 minutes. The lowest oxygen saturation was 72%.   Snoring: Snoring was present.  Respiratory events: The home study revealed a presence of 63 obstructive apneas and 0 mixed and 2 central apneas. There were 311 hypopneas resulting in a combined apnea/hypopnea index [AHI] of 46.6 events per hour.  AHI was 57.4 per hour supine, - per hour prone, 34.9 per hour on left side, and 59.8 per hour on right side.   Pattern: Excluding events noted above, respiratory " rate and pattern was Normal.    Position: Percent of time spent: supine - 25%, prone - 0%, on left - 50.7%, on right - 24%.    Heart Rate: By pulse oximetry normal rate was noted.     Assessment:   Severe obstructive sleep apnea.  Sleep associated hypoxemia was present.    Recommendations:  Consider auto-CPAP at 5-15 cmH2O or polysomnography with full night PAP titration.  Suggest optimizing sleep hygiene and avoiding sleep deprivation.  Weight management.    Diagnosis Code(s): Obstructive Sleep Apnea G47.33, Hypoxemia G47.36    Rodolfo Cunha MD, MD, March 20, 2019   Diplomate, American Board of Psychiatry and Neurology, Sleep Medicine

## 2019-03-21 ENCOUNTER — TELEPHONE (OUTPATIENT)
Dept: SLEEP MEDICINE | Facility: CLINIC | Age: 44
End: 2019-03-21

## 2019-04-04 ENCOUNTER — OFFICE VISIT (OUTPATIENT)
Dept: SLEEP MEDICINE | Facility: CLINIC | Age: 44
End: 2019-04-04
Payer: COMMERCIAL

## 2019-04-04 VITALS
WEIGHT: 315 LBS | RESPIRATION RATE: 16 BRPM | HEIGHT: 77 IN | SYSTOLIC BLOOD PRESSURE: 134 MMHG | BODY MASS INDEX: 37.19 KG/M2 | OXYGEN SATURATION: 97 % | DIASTOLIC BLOOD PRESSURE: 83 MMHG | HEART RATE: 87 BPM

## 2019-04-04 DIAGNOSIS — G47.33 OSA (OBSTRUCTIVE SLEEP APNEA): ICD-10-CM

## 2019-04-04 PROCEDURE — 99213 OFFICE O/P EST LOW 20 MIN: CPT | Performed by: PHYSICIAN ASSISTANT

## 2019-04-04 ASSESSMENT — MIFFLIN-ST. JEOR: SCORE: 2509.82

## 2019-04-04 NOTE — NURSING NOTE
"Chief Complaint   Patient presents with     Study Results     HST f/u       Initial /90   Pulse 87   Resp 16   Ht 1.949 m (6' 4.75\")   Wt (!) 150.1 kg (331 lb)   SpO2 97%   BMI 39.51 kg/m   Estimated body mass index is 39.51 kg/m  as calculated from the following:    Height as of this encounter: 1.949 m (6' 4.75\").    Weight as of this encounter: 150.1 kg (331 lb).    Medication Reconciliation: complete     ESS 14  Barb Blanco        "

## 2019-04-04 NOTE — PATIENT INSTRUCTIONS
You will be provided with an auto-titrating CPAP with a pressure range of 5-15 cm with heated humidity to limit nasal congestion. Adjust the heat level on humidifier to find a setting that prevents dry nose but does not cause condensation in the hose or mask. Use distilled water in the humidifier.  The CPAP has a ramp function that starts the pressure lower than your prescribed pressure and gradually increases it over a number of minutes.  This may make it easier to fall asleep.    Try to use the CPAP every-night, all night (minimum of 4 hours). Many insurances require that we prove you are using the CPAP at least 4 hours on at least 70% of nights over a 30 day period. We have 90 days to meet those criteria.            Discussed weight management and the impact of weight gain on sleep apnea.  Let me know if you snore or feel the pressure is too high.    You can get new supplies (mask, hose and filter) for your CPAP every 3-6 months, covered by insurance. You do not need to get supplies that often, but they are available if you would like them.  You may exchange the mask once within the first month if you feel the initial mask does not fit well.  Contact your medical equipment provider for equipment issues.  Please let me know if you have any return of snoring, daytime sleepiness or poor sleep quality. We will want to make sure your CPAP is adequately treating your apnea.  There is a website called CPAP.com that has accessories that may make CPAP use easier. Please visit it at your convenience.  Our phone number is [phone].    Follow-up 2 month.  Bring your CPAP machine with you to the follow up appointment.    Your BMI is Body mass index is 39.51 kg/m .  Weight management is a personal decision.  If you are interested in exploring weight loss strategies, the following discussion covers the approaches that may be successful. Body mass index (BMI) is one way to tell whether you are at a healthy weight,  overweight, or obese. It measures your weight in relation to your height.  A BMI of 18.5 to 24.9 is in the healthy range. A person with a BMI of 25 to 29.9 is considered overweight, and someone with a BMI of 30 or greater is considered obese. More than two-thirds of American adults are considered overweight or obese.  Being overweight or obese increases the risk for further weight gain. Excess weight may lead to heart disease and diabetes.  Creating and following plans for healthy eating and physical activity may help you improve your health.  Weight control is part of healthy lifestyle and includes exercise, emotional health, and healthy eating habits. Careful eating habits lifelong are the mainstay of weight control. Though there are significant health benefits from weight loss, long-term weight loss with diet alone may be very difficult to achieve- studies show long-term success with dietary management in less than 10% of people. Attaining a healthy weight may be especially difficult to achieve in those with severe obesity. In some cases, medications, devices and surgical management might be considered.  What can you do?  If you are overweight or obese and are interested in methods for weight loss, you should discuss this with your provider.     Consider reducing daily calorie intake by 500 calories.     Keep a food journal.     Avoiding skipping meals, consider cutting portions instead.    Diet combined with exercise helps maintain muscle while optimizing fat loss. Strength training is particularly important for building and maintaining muscle mass. Exercise helps reduce stress, increase energy, and improves fitness. Increasing exercise without diet control, however, may not burn enough calories to loose weight.       Start walking three days a week 10-20 minutes at a time    Work towards walking thirty minutes five days a week     Eventually, increase the speed of your walking for 1-2 minutes at time    In  addition, we recommend that you review healthy lifestyles and methods for weight loss available through the National Institutes of Health patient information sites:  http://win.niddk.nih.gov/publications/index.htm    And look into health and wellness programs that may be available through your health insurance provider, employer, local community center, or lindsay club.    Weight management plan: Patient was referred to their PCP to discuss a diet and exercise plan.   Juan Alberto to follow up with Primary Care provider regarding elevated blood pressure.

## 2019-04-04 NOTE — PROGRESS NOTES
Sleep Study Follow-Up Visit:    Date on this visit: 4/4/2019    Juan Alberto Menchaca comes in today for follow-up of his sleep study done on 3/18/2019 at the [facility] for snoring and observed periods where it looks like he is trying to catch his breath during sleep within the last year. His medical history is non-contributory.    Analysis Time:  484.3 minutes     Respiration:   Sleep Associated Hypoxemia: sustained hypoxemia was present. Baseline oxygen saturation was 90.8%.  Time with saturation less than or equal to 88% was 154 minutes. The lowest oxygen saturation was 72%.   Snoring: Snoring was present.  Respiratory events: The home study revealed a presence of 63 obstructive apneas and 0 mixed and 2 central apneas. There were 311 hypopneas resulting in a combined apnea/hypopnea index [AHI] of 46.6 events per hour.  AHI was 57.4 per hour supine, - per hour prone, 34.9 per hour on left side, and 59.8 per hour on right side.   Pattern: Excluding events noted above, respiratory rate and pattern was Normal.     Position: Percent of time spent: supine - 25%, prone - 0%, on left - 50.7%, on right - 24%.     Heart Rate: By pulse oximetry normal rate was noted.     Past medical/surgical history, family history, social history, medications and allergies were reviewed.      Problem List:  Patient Active Problem List    Diagnosis Date Noted     HARIS (obstructive sleep apnea) 03/20/2019     Priority: Medium     Severe HARIS          Impression/Plan:    (G47.33) HARIS (obstructive sleep apnea)  Comment: This test shows severe sleep apnea, relatively independent of position.  His hypoxemia appeared related to the apnea events.  Plan: Comprehensive DME         I am prescribing auto CPAP 5-15 cm, heated humidifier and compliance meter. The patient was informed of the mask exchange policy and the compliance goals. They were also informed that they would be followed by the sleep therapy management team during the first  month of CPAP use.       He will follow up with me in about 2 month(s).     Fifteen minutes spent with patient, all of which were spent face-to-face counseling, consulting, coordinating plan of care.      Bennett Goltz, PA-C    CC: Chrissie Cohen APRN CNP

## 2019-04-12 ENCOUNTER — RECORDS - HEALTHEAST (OUTPATIENT)
Dept: ADMINISTRATIVE | Facility: OTHER | Age: 44
End: 2019-04-12

## 2019-04-18 ENCOUNTER — DOCUMENTATION ONLY (OUTPATIENT)
Dept: SLEEP MEDICINE | Facility: CLINIC | Age: 44
End: 2019-04-18
Payer: COMMERCIAL

## 2019-04-18 DIAGNOSIS — G47.33 OSA (OBSTRUCTIVE SLEEP APNEA): ICD-10-CM

## 2019-04-18 NOTE — PROGRESS NOTES
Patient was offered choice of vendor and chose [facility].  Patient Juan Alberto Menchaca was set up at [facility] on April 18, 2019. Patient received a [employer]ics DreamStation Auto. Pressures were set at 5-15 cm H2O.   Patient s ramp is 5 cm H2O for Auto and FLEX/EPR is A Flex, 2.  Patient received a Resmed Mask name: AIRFIT N20  Nasal mask size Medium, heated tubing and heated humidifier.  Patient is enrolled in the STM Program and does not need to meet compliance. Patient has a follow up on TBD PT WILL CALL TO SCHEDULE     Bryan Campbell

## 2019-04-19 ENCOUNTER — DOCUMENTATION ONLY (OUTPATIENT)
Dept: SLEEP MEDICINE | Facility: CLINIC | Age: 44
End: 2019-04-19

## 2019-04-19 DIAGNOSIS — G47.33 OSA (OBSTRUCTIVE SLEEP APNEA): ICD-10-CM

## 2019-04-19 NOTE — PROGRESS NOTES
Called pt to schedule pap follow up appointment. Pt scheduled with Bennett Goltz pac on Wednesday June 5, 2019 at 11am.

## 2019-04-26 ENCOUNTER — DOCUMENTATION ONLY (OUTPATIENT)
Dept: SLEEP MEDICINE | Facility: CLINIC | Age: 44
End: 2019-04-26

## 2019-04-26 DIAGNOSIS — G47.33 OSA (OBSTRUCTIVE SLEEP APNEA): ICD-10-CM

## 2019-04-26 NOTE — PROGRESS NOTES
3 DAY STM VISIT    Diagnostic AHI:  46.6 HST    Patient contacted for 3 day STM visit  Message left for patient to return call     Device type: Auto-CPAP  PAP settings from order::  CPAP min 5 cm  H20       CPAP max 15 cm  H20  Mask type:    Nasal Mask     Device settings from machine      Min CPAP 5            Max CPAP 15      Assessment: Nightly usage over four hours.  Action plan: Pt to have f/u 14 day STM visit.  Patient has a follow up visit scheduled:   yes within 31-90 days of set up.

## 2019-05-03 ENCOUNTER — DOCUMENTATION ONLY (OUTPATIENT)
Dept: SLEEP MEDICINE | Facility: CLINIC | Age: 44
End: 2019-05-03
Payer: COMMERCIAL

## 2019-05-03 DIAGNOSIS — G47.33 OSA (OBSTRUCTIVE SLEEP APNEA): ICD-10-CM

## 2019-05-03 NOTE — PROGRESS NOTES
14 DAY STM VISIT    Diagnostic AHI:  46.6 HST      Message left for patient to return call.    Assessment: Pt meeting objective benchmarks.       Action plan: Waiting for patient to return call.     Device type: Auto-CPAP    PAP settings: CPAP min 5 cm  H20       CPAP max 15 cm  H20                              90th % [id number] cm  H20    Mask type:  Nasal Mask    Objective measures: 14 day rolling measures         Compliance  92 %     % of night spent in large leak  1 % last  upload      AHI 0.4   last  upload      Average number of minutes 454          Objective measure goal  Compliance   Goal >70%  Leak   Goal < 10%  AHI  Goal < 5  Usage  Goal >240

## 2019-05-20 ENCOUNTER — DOCUMENTATION ONLY (OUTPATIENT)
Dept: SLEEP MEDICINE | Facility: CLINIC | Age: 44
End: 2019-05-20

## 2019-05-20 DIAGNOSIS — G47.33 OSA (OBSTRUCTIVE SLEEP APNEA): ICD-10-CM

## 2019-05-20 NOTE — PROGRESS NOTES
30 DAY STM VISIT    Diagnostic AHI:   46.6 HST      Message left for patient to return call     Assessment: Pt meeting objective benchmarks.     Action plan: waiting for patient to return call.   Patient hasscheduled a follow up visit with Bennett Goltz, PA on 6/5/2019  Device type: Auto-CPAP  PAP settings: CPAP min 5 cm  H20     CPAP max 15 cm  H20          90th % [id number] cm  H20    Mask type:  Nasal Mask    Objective measures: 14 day rolling measures         Compliance  78 %     % of night spent in large leak  0 % last  upload      AHI 0.67   last  upload      Average number of minutes 387          Objective measure goal  Compliance   Goal >70%  Leak   Goal < 10%  AHI  Goal < 5  Usage  Goal >240

## 2019-05-22 ENCOUNTER — OFFICE VISIT (OUTPATIENT)
Dept: FAMILY MEDICINE | Facility: CLINIC | Age: 44
End: 2019-05-22
Payer: COMMERCIAL

## 2019-05-22 VITALS
BODY MASS INDEX: 40.08 KG/M2 | DIASTOLIC BLOOD PRESSURE: 80 MMHG | SYSTOLIC BLOOD PRESSURE: 122 MMHG | RESPIRATION RATE: 16 BRPM | TEMPERATURE: 98.4 F | WEIGHT: 315 LBS | HEART RATE: 100 BPM

## 2019-05-22 DIAGNOSIS — Z01.818 PREOP GENERAL PHYSICAL EXAM: Primary | ICD-10-CM

## 2019-05-22 DIAGNOSIS — S83.242D TEAR OF MEDIAL MENISCUS OF LEFT KNEE, CURRENT, UNSPECIFIED TEAR TYPE, SUBSEQUENT ENCOUNTER: ICD-10-CM

## 2019-05-22 DIAGNOSIS — E66.01 MORBID OBESITY (H): ICD-10-CM

## 2019-05-22 PROCEDURE — 99214 OFFICE O/P EST MOD 30 MIN: CPT | Performed by: FAMILY MEDICINE

## 2019-05-22 NOTE — PROGRESS NOTES
[address], [address]  [facility] [address]  [phone]  Dept: [phone]    PRE-OP EVALUATION:  Today's date: 2019    Juan Alberto Menchaca (: 1975) presents for pre-operative evaluation assessment as requested by Dr. Sky. He requires evaluation and anesthesia risk assessment prior to undergoing surgery/procedure for treatment of torn meniscus .    Fax number for surgical facility: unknown   Primary Physician: Sravanthi Daniel  Type of Anesthesia Anticipated: to be determined    Patient has a Health Care Directive or Living Will:  YES living will     Preop Questions 2019   Who is [facility] your [facility]? [facility]   What are you having done? orthoscopic on left knee   Date of Surgery/Procedure: 19   Facility or Hospital where procedure/surgery will be performed: [facility]   1.  Do you have a history of Heart attack, stroke, stent, coronary bypass surgery, or other heart surgery? No   2.  Do you ever have any pain or discomfort in your chest? No   3.  Do you have a history of  Heart Failure? No   4.   Are you troubled by shortness of breath when:  walking on a level surface, or up a slight hill, or at night? No   5.  Do you currently have a cold, bronchitis or other respiratory infection? No   6.  Do you have a cough, shortness of breath, or wheezing? No   7.  Do you sometimes get pains in the calves of your legs when you walk? No   8. Do you or anyone in your family have previous history of blood clots? No   9.  Do you or does anyone in your family have a serious bleeding problem such as prolonged bleeding following surgeries or cuts? No   10. Have you ever had problems with anemia or been told to take iron pills? No   11. Have you had any abnormal blood loss such as black, tarry or bloody stools? No   12. Have you ever had a blood transfusion? No   13. Have you or any of your relatives ever had problems with anesthesia?  No   14. Do you have sleep apnea, excessive snoring or daytime drowsiness? YES -sleep apnea and snoring    15. Do you have any prosthetic heart valves? No   16. Do you have prosthetic joints? No         HPI:     HPI related to upcoming procedure: Torn L medial mensicus.  Otherwise feeling well, no fever, cold/flu sx, n/v, change bowels.  Denies CP, palpitations, edema, dyspnea, HA, vision changes.      See problem list for active medical problems.  Problems all longstanding and stable, except as noted/documented.  See ROS for pertinent symptoms related to these conditions.                                                                                                                                                          .    MEDICAL HISTORY:     Patient Active Problem List    Diagnosis Date Noted     HARIS (obstructive sleep apnea) 03/20/2019     Priority: Medium     Severe HARIS        Past Medical History:   Diagnosis Date     Acid reflux      Past Surgical History:   Procedure Laterality Date     AS KNEE SCOPE, DIAGNOSTIC       Current Outpatient Medications   Medication Sig Dispense Refill     ranitidine (ZANTAC) 150 MG tablet Take 150 mg by mouth At Bedtime       OTC products: None, except as noted above    No Known Allergies   Latex Allergy: NO    Social History     Tobacco Use     Smoking status: Former Smoker     Packs/day: 1.00     Years: 17.00     Pack years: 17.00     Types: Cigarettes     Smokeless tobacco: Never Used     Tobacco comment: quit in about 2007, 1-1.5 ppd   Substance Use Topics     Alcohol use: Yes     Comment: 2 per night after work on most nights     History   Drug Use No       REVIEW OF SYSTEMS:   Constitutional, neuro, ENT, endocrine, pulmonary, cardiac, gastrointestinal, genitourinary, musculoskeletal, integument and psychiatric systems are negative, except as otherwise noted.    EXAM:   /80 (BP Location: Right arm, Patient Position: Sitting, Cuff Size: Adult Large)   Pulse 100    Temp 98.4  F (36.9  C) (Oral)   Resp 16   Wt (!) 152.3 kg (335 lb 12.8 oz)   BMI 40.08 kg/m      GENERAL APPEARANCE: healthy, alert and no distress     EYES: EOMI,  PERRL     HENT: ear canals and TM's normal and nose and mouth without ulcers or lesions     NECK: no adenopathy, no asymmetry, masses, or scars and thyroid normal to palpation     RESP: lungs clear to auscultation - no rales, rhonchi or wheezes     CV: regular rates and rhythm, normal S1 S2, no S3 or S4 and no murmur, click or rub     ABDOMEN:  soft, nontender, no HSM or masses and bowel sounds normal     MS: extremities normal- no gross deformities noted, no evidence of inflammation in joints, FROM in all extremities.     SKIN: no suspicious lesions or rashes     NEURO: Normal strength and tone, sensory exam grossly normal, mentation intact and speech normal     PSYCH: mentation appears normal. and affect normal/bright     LYMPHATICS: No cervical adenopathy    DIAGNOSTICS:   EKG: Not indicated due to non-vascular surgery and low risk of event (age <65 and without cardiac risk factors)    Recent Labs   Lab Test 02/15/19  1441      POTASSIUM 3.6   CR 0.82        IMPRESSION:   Reason for surgery/procedure: L medial meniscus tear  Diagnosis/reason for consult: preoperative clearance    The proposed surgical procedure is considered INTERMEDIATE risk.    REVISED CARDIAC RISK INDEX  The patient has the following serious cardiovascular risks for perioperative complications such as (MI, PE, VFib and 3  AV Block):  No serious cardiac risks  INTERPRETATION: 0 risks: Class I (very low risk - 0.4% complication rate)    The patient has the following additional risks for perioperative complications:  No identified additional risks      ICD-10-CM    1. Preop general physical exam Z01.818        RECOMMENDATIONS:       --Patient is on no chronic medications    APPROVAL GIVEN to proceed with proposed procedure, without further diagnostic evaluation       Signed  Electronically by: Vinod Arevalo MD    Copy of this evaluation report is provided to requesting physician.    [place] Preop Guidelines    Revised Cardiac Risk Index

## 2019-05-22 NOTE — NURSING NOTE
"Chief Complaint   Patient presents with     Pre-Op Exam     Initial /80 (BP Location: Right arm, Patient Position: Sitting, Cuff Size: Adult Large)   Pulse 100   Temp 98.4  F (36.9  C) (Oral)   Resp 16   Wt (!) 152.3 kg (335 lb 12.8 oz)   BMI 40.08 kg/m   Estimated body mass index is 40.08 kg/m  as calculated from the following:    Height as of 4/4/19: 1.949 m (6' 4.75\").    Weight as of this encounter: 152.3 kg (335 lb 12.8 oz).  BP completed using cuff size large right arm    Lisa Magill, CMA    "

## 2019-05-29 ENCOUNTER — NURSE TRIAGE (OUTPATIENT)
Dept: NURSING | Facility: CLINIC | Age: 44
End: 2019-05-29

## 2019-05-29 ENCOUNTER — RECORDS - HEALTHEAST (OUTPATIENT)
Dept: ADMINISTRATIVE | Facility: OTHER | Age: 44
End: 2019-05-29

## 2019-05-30 NOTE — TELEPHONE ENCOUNTER
"Patient calls asking about how to take PRN meds (oxycodone, hydroxyzine, ketorolac).  Patient reports he says he was told he should take one of the medication with another but not the other one.  Patient's surgery was done [facility].  FNA advised to call them since there is no documentation about how he should take his medications in his chart, FNA would not know how to advise him.  Caller verbalizes understanding.        Reason for Disposition    Caller has URGENT medication question about med that PCP prescribed and triager unable to answer question    Additional Information    Negative: Drug overdose and nurse unable to answer question    Negative: Caller requesting information not related to medicine    Negative: Caller requesting a prescription for Strep throat and has a positive culture result    Negative: Rash while taking a medication or within 3 days of stopping it    Negative: Immunization reaction suspected    Negative: [1] Asthma and [2] having symptoms of asthma (cough, wheezing, etc)    Negative: MORE THAN A DOUBLE DOSE of a prescription or over-the-counter (OTC) drug    Negative: [1] DOUBLE DOSE (an extra dose or lesser amount) of over-the-counter (OTC) drug AND [2] any symptoms (e.g., dizziness, nausea, pain, sleepiness)    Negative: [1] DOUBLE DOSE (an extra dose or lesser amount) of prescription drug AND [2] any symptoms (e.g., dizziness, nausea, pain, sleepiness)    Negative: Took another person's prescription drug    Negative: [1] DOUBLE DOSE (an extra dose or lesser amount) of prescription drug AND [2] NO symptoms (Exception: a double dose of antibiotics)    Negative: Diabetes drug error or overdose (e.g., insulin or extra dose)    Negative: [1] Request for URGENT new prescription or refill of \"essential\" medication (i.e., likelihood of harm to patient if not taken) AND [2] triager unable to fill per unit policy    Negative: [1] Prescription not at pharmacy AND [2] was prescribed today " by PCP    Negative: Pharmacy calling with prescription questions and triager unable to answer question    Protocols used: MEDICATION QUESTION CALL-A-AH

## 2019-06-05 ENCOUNTER — OFFICE VISIT (OUTPATIENT)
Dept: SLEEP MEDICINE | Facility: CLINIC | Age: 44
End: 2019-06-05
Payer: COMMERCIAL

## 2019-06-05 VITALS
OXYGEN SATURATION: 94 % | HEART RATE: 84 BPM | WEIGHT: 315 LBS | RESPIRATION RATE: 16 BRPM | SYSTOLIC BLOOD PRESSURE: 133 MMHG | HEIGHT: 77 IN | DIASTOLIC BLOOD PRESSURE: 85 MMHG | BODY MASS INDEX: 37.19 KG/M2

## 2019-06-05 DIAGNOSIS — G47.33 OSA (OBSTRUCTIVE SLEEP APNEA): ICD-10-CM

## 2019-06-05 PROCEDURE — 99214 OFFICE O/P EST MOD 30 MIN: CPT | Performed by: PHYSICIAN ASSISTANT

## 2019-06-05 ASSESSMENT — MIFFLIN-ST. JEOR: SCORE: 2532.5

## 2019-06-05 NOTE — NURSING NOTE
"Chief Complaint   Patient presents with     CPAP Follow Up     Follow up jacob        Initial /85   Pulse 84   Resp 16   Ht 1.949 m (6' 4.75\")   Wt (!) 152.4 kg (336 lb)   SpO2 94%   BMI 40.10 kg/m   Estimated body mass index is 40.1 kg/m  as calculated from the following:    Height as of this encounter: 1.949 m (6' 4.75\").    Weight as of this encounter: 152.4 kg (336 lb).    Medication Reconciliation: complete    ESS 6    Anais Salvador MA      "

## 2019-06-05 NOTE — PATIENT INSTRUCTIONS
Your BMI is Body mass index is 40.1 kg/m .  Weight management is a personal decision.  If you are interested in exploring weight loss strategies, the following discussion covers the approaches that may be successful. Body mass index (BMI) is one way to tell whether you are at a healthy weight, overweight, or obese. It measures your weight in relation to your height.  A BMI of 18.5 to 24.9 is in the healthy range. A person with a BMI of 25 to 29.9 is considered overweight, and someone with a BMI of 30 or greater is considered obese. More than two-thirds of American adults are considered overweight or obese.  Being overweight or obese increases the risk for further weight gain. Excess weight may lead to heart disease and diabetes.  Creating and following plans for healthy eating and physical activity may help you improve your health.  Weight control is part of healthy lifestyle and includes exercise, emotional health, and healthy eating habits. Careful eating habits lifelong are the mainstay of weight control. Though there are significant health benefits from weight loss, long-term weight loss with diet alone may be very difficult to achieve- studies show long-term success with dietary management in less than 10% of people. Attaining a healthy weight may be especially difficult to achieve in those with severe obesity. In some cases, medications, devices and surgical management might be considered.  What can you do?  If you are overweight or obese and are interested in methods for weight loss, you should discuss this with your provider.     Consider reducing daily calorie intake by 500 calories.     Keep a food journal.     Avoiding skipping meals, consider cutting portions instead.    Diet combined with exercise helps maintain muscle while optimizing fat loss. Strength training is particularly important for building and maintaining muscle mass. Exercise helps reduce stress, increase energy, and improves fitness.  Increasing exercise without diet control, however, may not burn enough calories to loose weight.       Start walking three days a week 10-20 minutes at a time    Work towards walking thirty minutes five days a week     Eventually, increase the speed of your walking for 1-2 minutes at time    In addition, we recommend that you review healthy lifestyles and methods for weight loss available through the National Institutes of Health patient information sites:  http://win.niddk.nih.gov/publications/index.htm    And look into health and wellness programs that may be available through your health insurance provider, employer, local community center, or [employer].

## 2019-06-05 NOTE — PROGRESS NOTES
Sleep Study Follow-Up Visit:    Date on this visit: 6/5/2019    Juan Alberto Menchaca comes in today for follow-up of his CPAP use for severe HARIS. He was initially seen at the [facility] for snoring and observed periods where it looks like he is trying to catch his breath during sleep within the last year. His medical history is non-contributory.     HST results from 3/18/2019:  AHI 46.6/hr. AHI was 57.4 per hour supine, - per hour prone, 34.9 per hour on left side, and 59.8 per hour on right side. Baseline oxygen saturation was 90.8%.  Time with saturation less than or equal to 88% was 154 minutes. The lowest oxygen saturation was 72%.   Position: Percent of time spent: supine - 25%, prone - 0%, on left - 50.7%, on right - 24%.  He is on auto CPAP 5-15 cm. He loves CPAP. His wife says he snores minimally if at all. He feels much more awake when he gets up. He no longer dozes with sitting and relaxing. He has one concern, which is that he has to lay down for a little before putting the mask on. If he puts it on right away, he hyperventilates. He is comfortable with the air pressure, otherwise. He does not fall asleep before putting the mask on. He does not get much mask leak. He gets a little skin irritation at the side of his nose. He uses an AirFit N20. He sometimes gets a dry mouth.    The compliance data shows that he has used the CPAP for 29/30 nights, 93.3% of nights for >4 hours.  The 90th% pressure is 13.9 cm.  The average time in large leak is 8:03.  The average nightly usage is 8:03.  The average AHI is 0.9/hr. The pressure often goes up to the upper limit in response to snoring. The snore index is 40.  He just had a knee scope last week. He has been taking oxycodone and hydroxyzine at bedtime while recovering from the surgery. He will probably be done with that in the next 2 days.     Past medical/surgical history, family history, social history, medications and allergies were reviewed.       Problem List:  Patient Active Problem List    Diagnosis Date Noted     Morbid obesity (H) 05/22/2019     Priority: Medium     HARIS (obstructive sleep apnea) 03/20/2019     Priority: Medium     Severe HARIS          Impression/Plan:    (G47.33) HARIS (obstructive sleep apnea)  Comment: He loves CPAP. His pressure is often at the upper limit due to snoring. His wife notices a little snoring. His AHI is very low though. He may be having some mouth or mask leak, but it seems minimal.  Plan: Comprehensive DME        Changed pressures to 7-17 cm for residual snoring. He was shown the Nose Breathe Trainer. He was encouraged to let me know if the pressure increase seems to make the dry mouth/mouth leak worse.      He will follow up with me in about 1 year(s).     Twenty-five minutes spent with patient, all of which were spent face-to-face counseling, consulting, coordinating plan of care.      Bennett Goltz, PA-C    CC: No ref. provider found

## 2019-06-07 ENCOUNTER — RECORDS - HEALTHEAST (OUTPATIENT)
Dept: ADMINISTRATIVE | Facility: OTHER | Age: 44
End: 2019-06-07

## 2019-06-28 ENCOUNTER — RECORDS - HEALTHEAST (OUTPATIENT)
Dept: ADMINISTRATIVE | Facility: OTHER | Age: 44
End: 2019-06-28

## 2019-09-30 ENCOUNTER — HEALTH MAINTENANCE LETTER (OUTPATIENT)
Age: 44
End: 2019-09-30

## 2019-10-15 ENCOUNTER — DOCUMENTATION ONLY (OUTPATIENT)
Dept: SLEEP MEDICINE | Facility: CLINIC | Age: 44
End: 2019-10-15

## 2019-10-15 DIAGNOSIS — G47.33 OSA (OBSTRUCTIVE SLEEP APNEA): ICD-10-CM

## 2019-10-15 NOTE — PROGRESS NOTES
6 Month STM visit    Diagnostic AHI:   46.6  HST    Message left for patient to return call     Assessment: Pt not meeting objective benchmarks for compliance     Action plan: waiting for patient to return call.   pt to follow up per provider request    Device type: Auto-CPAP  PAP settings: CPAP min 7 cm  H20     CPAP max 17 cm  H20          90th % [id number] cm  H20    Objective measures: 14 day rolling measures         Compliance  64 %     % of night spent in large leak  1 % last  upload      AHI 0.93   last  upload      Average number of minutes 356           Objective measure goal  Compliance   Goal >70%  Leak   Goal < 10%  AHI  Goal < 5  Usage  Goal >240

## 2019-11-12 ENCOUNTER — OFFICE VISIT (OUTPATIENT)
Dept: FAMILY MEDICINE | Facility: CLINIC | Age: 44
End: 2019-11-12
Payer: COMMERCIAL

## 2019-11-12 VITALS
HEIGHT: 77 IN | DIASTOLIC BLOOD PRESSURE: 80 MMHG | OXYGEN SATURATION: 97 % | HEART RATE: 91 BPM | SYSTOLIC BLOOD PRESSURE: 118 MMHG | TEMPERATURE: 98.8 F | RESPIRATION RATE: 16 BRPM | WEIGHT: 315 LBS | BODY MASS INDEX: 37.19 KG/M2

## 2019-11-12 DIAGNOSIS — M54.16 LUMBAR RADICULOPATHY: Primary | ICD-10-CM

## 2019-11-12 PROCEDURE — 99213 OFFICE O/P EST LOW 20 MIN: CPT | Performed by: NURSE PRACTITIONER

## 2019-11-12 RX ORDER — METHYLPREDNISOLONE 4 MG
TABLET, DOSE PACK ORAL
Qty: 21 TABLET | Refills: 0 | Status: SHIPPED | OUTPATIENT
Start: 2019-11-12 | End: 2020-02-14

## 2019-11-12 ASSESSMENT — MIFFLIN-ST. JEOR: SCORE: 2505.28

## 2019-11-12 NOTE — PROGRESS NOTES
"Subjective     Juan Alberto Menchaca is a 44 year old male who presents to clinic today for the following health issues:    HPI   Joint Pain    Onset: x 3 months    Description:   Location: outer left thigh  Character: Stabbing and numbness    Intensity: moderate    Progression of Symptoms: same, intermittent    Accompanying Signs & Symptoms:  Other symptoms: numbness and tingling    History:   Previous similar pain: no       Precipitating factors:   Trauma or overuse: no     Alleviating factors:  Improved by: sitting down    Therapies Tried and outcome: nothing    Pain 5-6/10, progressively worsening.  Burning, sharp pain down the L lateral thigh stopping at the knee.  Area always feels numb.  Standing makes pain worse.  Denies back pain.        Current Outpatient Medications   Medication Sig Dispense Refill     ranitidine (ZANTAC) 150 MG tablet Take 150 mg by mouth At Bedtime       No Known Allergies    Reviewed and updated as needed this visit by Provider         Review of Systems   ROS COMP: Constitutional, HEENT, cardiovascular, pulmonary, gi and gu, MS and skin systems are negative, except as otherwise noted.      Objective    /80 (BP Location: Right arm, Patient Position: Sitting, Cuff Size: Adult Large)   Pulse 91   Temp 98.8  F (37.1  C) (Oral)   Resp 16   Ht 1.949 m (6' 4.75\")   Wt 149.7 kg (330 lb)   SpO2 97%   BMI 39.39 kg/m    Body mass index is 39.39 kg/m .  Physical Exam   GENERAL: healthy, alert and no distress  MS: no gross musculoskeletal defects noted, back: no midline or paraspinal tenderness, normal gait, SLR normal bilat, normal LE strength and DTRs  SKIN: no suspicious lesions or rashes    Diagnostic Test Results:  Labs reviewed in Epic        Assessment & Plan     1. Lumbar radiculopathy  Ongoing symptoms in the L4-L5 distribution for 3 months, progressively worsening.  Will get an MRI for further evaluation.  F/u based on results.   - MR Lumbar Spine w/o Contrast; Future  - " methylPREDNISolone (MEDROL DOSEPAK) 4 MG tablet therapy pack; Follow Package Directions  Dispense: 21 tablet; Refill: 0     No follow-ups on file.    YORDY Anand [facility]

## 2019-12-02 ENCOUNTER — HOSPITAL ENCOUNTER (OUTPATIENT)
Dept: MRI IMAGING | Facility: CLINIC | Age: 44
Discharge: HOME | End: 2019-12-02
Attending: NURSE PRACTITIONER | Admitting: NURSE PRACTITIONER
Payer: COMMERCIAL

## 2019-12-02 DIAGNOSIS — M54.16 LUMBAR RADICULOPATHY: ICD-10-CM

## 2019-12-02 PROCEDURE — 72148 MRI LUMBAR SPINE W/O DYE: CPT

## 2019-12-03 ENCOUNTER — MYC MEDICAL ADVICE (OUTPATIENT)
Dept: FAMILY MEDICINE | Facility: CLINIC | Age: 44
End: 2019-12-03

## 2019-12-03 ENCOUNTER — E-VISIT (OUTPATIENT)
Dept: FAMILY MEDICINE | Facility: CLINIC | Age: 44
End: 2019-12-03

## 2019-12-03 ENCOUNTER — OFFICE VISIT (OUTPATIENT)
Dept: URGENT CARE | Facility: URGENT CARE | Age: 44
End: 2019-12-03
Payer: COMMERCIAL

## 2019-12-03 VITALS
DIASTOLIC BLOOD PRESSURE: 86 MMHG | OXYGEN SATURATION: 97 % | TEMPERATURE: 98.1 F | HEART RATE: 77 BPM | SYSTOLIC BLOOD PRESSURE: 128 MMHG

## 2019-12-03 DIAGNOSIS — M54.50 ACUTE LOW BACK PAIN, UNSPECIFIED BACK PAIN LATERALITY, UNSPECIFIED WHETHER SCIATICA PRESENT: Primary | ICD-10-CM

## 2019-12-03 DIAGNOSIS — M54.42 ACUTE LEFT-SIDED LOW BACK PAIN WITH LEFT-SIDED SCIATICA: Primary | ICD-10-CM

## 2019-12-03 PROCEDURE — 99207 ZZC NO BILLABLE SERVICE THIS VISIT: CPT | Performed by: FAMILY MEDICINE

## 2019-12-03 PROCEDURE — 99213 OFFICE O/P EST LOW 20 MIN: CPT | Performed by: PHYSICIAN ASSISTANT

## 2019-12-04 NOTE — NURSING NOTE
Chief Complaint   Patient presents with     Urgent Care     Back Pain     lower-left back pain. Patient just had a MRI 12-2-19.        BHAVESH LANIER CMA

## 2019-12-04 NOTE — PATIENT INSTRUCTIONS
Patient Education     Understanding Lumbar Radiculopathy    Lumbar radiculopathy is irritation or inflammation of a nerve root in the low back. It causes symptoms that spread out from the back down one or both legs. To understand this condition, it helps to understand the parts of the spine:    Vertebrae. These are bones that stack to form the spine. The lumbar spine contains the 5 bottom vertebrae.    Disks. These are soft pads of tissue between the vertebrae. They act as shock absorbers for the spine.    Spinal canal. This is a tunnel formed within the stacked vertebrae. In the lumbar spine, nerves run through this canal.    Nerves. These branch off and leave the spinal canal, traveling out to parts of the body. As they leave the spinal canal, nerves pass through openings between the vertebrae. The nerve root is the part of the nerve that is closest to the spinal canal.    Sciatic nerve. This is a large nerve formed from several nerve roots in the low back. This nerve extends down the back of the leg to the foot.  With lumbar radiculopathy, nerve roots in the low back become irritated. This leads to pain and symptoms. The sciatic nerve is commonly involved, so the condition is often called sciatica.  What causes lumbar radiculopathy?  Aging, injury, poor posture, extra body weight, and other issues can lead to problems in the low back. These problems may then irritate nerve roots. They include:    Damage to a disk in the lumbar spine. The damaged disk may then press on nearby nerve roots.    Degeneration from wear and tear, and aging. This can lead to narrowing (stenosis) of the openings between the vertebrae. The narrowed openings press on nerve roots as they leave the spinal canal.    Unstable spine. This is when a vertebra slips forward. It can then press on a nerve root.  Other, less common things can put pressure on nerves in the low back. These include diabetes, infection, or a tumor.  Symptoms of lumbar  radiculopathy  These include:    Pain in the low back    Pain, numbness, tingling, or weakness that travels into the buttocks, hip, groin, or leg    Muscle spasms  Treatment for lumbar radiculopathy  In most cases, your healthcare provider will first try treatments that help relieve symptoms. These may include:    Prescription and over-the-counter pain medicines. These help relieve pain, swelling, and irritation.    Limits on positions and activities that increase pain. But lying in bed or avoiding all movement is only recommended for a short period of time.    Physical therapy, including exercises and stretches. This helps decrease pain and increase movement and function.    Steroid shots into the lower back. This may help relieve symptoms for a time.    Weight-loss program. If you are overweight, losing extra pounds may help relieve symptoms.  In some cases, you may need surgery to fix the underlying problem. This depends on the cause, the symptoms, and how long the pain has lasted.  Possible complications  Over time, an irritated and inflamed nerve may become damaged. This may lead to long-lasting (permanent) numbness or weakness in your legs and feet. If symptoms change suddenly or get worse, be sure to let your healthcare provider know.  When to call your healthcare provider  Call your healthcare provider right away if you have any of these:    New pain or pain that gets worse    New or increasing weakness, tingling, or numbness in your leg or foot    Problems controlling your bladder or bowel   Date Last Reviewed: 3/10/2016    3698-3518 [employer]. [address] [place] [address], [place] [address]. All rights reserved. This information is not intended as a substitute for professional medical care. Always follow your healthcare professional's instructions.

## 2019-12-04 NOTE — PROGRESS NOTES
SUBJECTIVE:  Chief Complaint   Patient presents with     Urgent Care     Back Pain     lower-left back pain. Patient just had a MRI 12-2-19.     Juan Alberto Menchaca is a 44 year old male presents with a chief complaint of left back pain.  The pain began 1 week ago and radiates to leg  No injury.  Has been assessed, but has not started treatment    No red flags per patient    Past Medical History:   Diagnosis Date     Acid reflux      Current Outpatient Medications   Medication Sig Dispense Refill     methylPREDNISolone (MEDROL DOSEPAK) 4 MG tablet therapy pack Follow Package Directions (Patient not taking: Reported on 12/5/2019) 21 tablet 0     ranitidine (ZANTAC) 150 MG tablet Take 150 mg by mouth At Bedtime       Social History     Tobacco Use     Smoking status: Former Smoker     Packs/day: 1.00     Years: 17.00     Pack years: 17.00     Types: Cigarettes     Smokeless tobacco: Never Used     Tobacco comment: quit in about 2007, 1-1.5 ppd   Substance Use Topics     Alcohol use: Yes     Comment: 2 per night after work on most nights       ROS:  10 point ROS negative except as listed above    EXAM:   /86 (Cuff Size: Adult Large)   Pulse 77   Temp 98.1  F (36.7  C)   SpO2 97%   Gen: healthy,alert,no distress.  Extremity: back has FROM. No midline tenderness, crepitus. Straight leg negative.   GENERAL APPEARANCE: healthy, alert and no distress  CHEST: clear to auscultation  CV: regular rate and rhythm  MS: no gross deformities noted, no evidence of inflammation in joints, FROM in all extremities.  SKIN: no suspicious lesions or rashes  NEURO: Normal strength and tone, sensory exam grossly normal, mentation intact and speech normal      ASSESSMENT:   (M54.42) Acute left-sided low back pain with left-sided sciatica  (primary encounter diagnosis)  Comment: no red flags  Plan: begin treatment plan as prescribed    Patient Instructions       Patient Education     Understanding Lumbar Radiculopathy    Lumbar  radiculopathy is irritation or inflammation of a nerve root in the low back. It causes symptoms that spread out from the back down one or both legs. To understand this condition, it helps to understand the parts of the spine:    Vertebrae. These are bones that stack to form the spine. The lumbar spine contains the 5 bottom vertebrae.    Disks. These are soft pads of tissue between the vertebrae. They act as shock absorbers for the spine.    Spinal canal. This is a tunnel formed within the stacked vertebrae. In the lumbar spine, nerves run through this canal.    Nerves. These branch off and leave the spinal canal, traveling out to parts of the body. As they leave the spinal canal, nerves pass through openings between the vertebrae. The nerve root is the part of the nerve that is closest to the spinal canal.    Sciatic nerve. This is a large nerve formed from several nerve roots in the low back. This nerve extends down the back of the leg to the foot.  With lumbar radiculopathy, nerve roots in the low back become irritated. This leads to pain and symptoms. The sciatic nerve is commonly involved, so the condition is often called sciatica.  What causes lumbar radiculopathy?  Aging, injury, poor posture, extra body weight, and other issues can lead to problems in the low back. These problems may then irritate nerve roots. They include:    Damage to a disk in the lumbar spine. The damaged disk may then press on nearby nerve roots.    Degeneration from wear and tear, and aging. This can lead to narrowing (stenosis) of the openings between the vertebrae. The narrowed openings press on nerve roots as they leave the spinal canal.    Unstable spine. This is when a vertebra slips forward. It can then press on a nerve root.  Other, less common things can put pressure on nerves in the low back. These include diabetes, infection, or a tumor.  Symptoms of lumbar radiculopathy  These include:    Pain in the low back    Pain,  numbness, tingling, or weakness that travels into the buttocks, hip, groin, or leg    Muscle spasms  Treatment for lumbar radiculopathy  In most cases, your healthcare provider will first try treatments that help relieve symptoms. These may include:    Prescription and over-the-counter pain medicines. These help relieve pain, swelling, and irritation.    Limits on positions and activities that increase pain. But lying in bed or avoiding all movement is only recommended for a short period of time.    Physical therapy, including exercises and stretches. This helps decrease pain and increase movement and function.    Steroid shots into the lower back. This may help relieve symptoms for a time.    Weight-loss program. If you are overweight, losing extra pounds may help relieve symptoms.  In some cases, you may need surgery to fix the underlying problem. This depends on the cause, the symptoms, and how long the pain has lasted.  Possible complications  Over time, an irritated and inflamed nerve may become damaged. This may lead to long-lasting (permanent) numbness or weakness in your legs and feet. If symptoms change suddenly or get worse, be sure to let your healthcare provider know.  When to call your healthcare provider  Call your healthcare provider right away if you have any of these:    New pain or pain that gets worse    New or increasing weakness, tingling, or numbness in your leg or foot    Problems controlling your bladder or bowel   Date Last Reviewed: 3/10/2016    8003-9802 [employer]. [address] [place] [address], [place] [address]. All rights reserved. This information is not intended as a substitute for professional medical care. Always follow your healthcare professional's instructions.

## 2019-12-05 ENCOUNTER — OFFICE VISIT (OUTPATIENT)
Dept: FAMILY MEDICINE | Facility: CLINIC | Age: 44
End: 2019-12-05
Payer: COMMERCIAL

## 2019-12-05 VITALS
BODY MASS INDEX: 39.75 KG/M2 | WEIGHT: 315 LBS | SYSTOLIC BLOOD PRESSURE: 124 MMHG | RESPIRATION RATE: 16 BRPM | TEMPERATURE: 98.1 F | DIASTOLIC BLOOD PRESSURE: 80 MMHG | OXYGEN SATURATION: 97 % | HEART RATE: 88 BPM

## 2019-12-05 DIAGNOSIS — G57.11 MERALGIA PARESTHETICA OF RIGHT SIDE: Primary | ICD-10-CM

## 2019-12-05 PROCEDURE — 99213 OFFICE O/P EST LOW 20 MIN: CPT | Performed by: FAMILY MEDICINE

## 2019-12-05 ASSESSMENT — ENCOUNTER SYMPTOMS
CONSTITUTIONAL NEGATIVE: 1
PARESTHESIAS: 1
NUMBNESS: 1
BACK PAIN: 0
JOINT SWELLING: 0
WEAKNESS: 0
ARTHRALGIAS: 0

## 2019-12-05 NOTE — LETTER
[address], [address]  [facility] [address]  Phone: [phone]  Fax: [phone]    December 5, 2019        Juan Alberto Menchaca  [address]  [facility] [address]          To whom it may concern:    RE: Juan Alberto Menchaca    Patient was seen and treated today at our clinic and missed work.  Patient may return to work 12/6/19 with the following:  No working or lifting restrictions    Please contact me for questions or concerns.      Sincerely,        Vinod Arevalo MD

## 2019-12-05 NOTE — PROGRESS NOTES
Subjective     Juan Alberto Menchaca is a 44 year old male who presents to clinic today for the following health issues:    HPI   ED/UC Followup:    Facility:  [facility]  Date of visit: 12/3/2019  Reason for visit: back pain  Current Status: would like to discuss MRI results and any recommended work restrictions     Did have a fall about 2 years ago at work.  Did have another fall about one year ago, tore his L meniscus.  Since the surgery to repair this has been having persistent burning along the side of his R thigh.  Was seen her about 1m ago, did have an MRI recently to investigate this.  Notes sensation is pins and needles, burning.  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         Review of Systems   Constitutional: Negative.    Musculoskeletal: Negative for arthralgias, back pain and joint swelling.   Neurological: Positive for numbness and paresthesias. Negative for weakness.        Objective    /80 (BP Location: Right arm, Patient Position: Chair, Cuff Size: Adult Large)   Pulse 88   Temp 98.1  F (36.7  C) (Oral)   Resp 16   Wt (!) 151 kg (333 lb)   SpO2 97%   BMI 39.75 kg/m    Body mass index is 39.75 kg/m .  Physical Exam  Vitals signs and nursing note reviewed.   Constitutional:       Appearance: Normal appearance. He is obese.   Musculoskeletal:        Legs:       Comments: Lateral leg parathesia   Skin:     General: Skin is warm and dry.   Neurological:      General: No focal deficit present.      Mental Status: He is alert and oriented to person, place, and time.   Psychiatric:         Mood and Affect: Mood normal.         Behavior: Behavior normal.        Assessment and Plan    (G57.11) Meralgia paresthetica of right side  (primary encounter diagnosis)  Comment: reviewed MRI in detail, no significant degenerative diease in back, nothing to suggest this back pain or injury.  Indeed, given description of affected and large  abdominal pannus I suspect this is LFCN entrapment  Plan: counseled on weight loss      RTC in 6m prn    Vinod Arevalo MD

## 2020-02-14 ENCOUNTER — OFFICE VISIT (OUTPATIENT)
Dept: FAMILY MEDICINE | Facility: CLINIC | Age: 45
End: 2020-02-14

## 2020-02-14 VITALS
SYSTOLIC BLOOD PRESSURE: 130 MMHG | TEMPERATURE: 97.8 F | DIASTOLIC BLOOD PRESSURE: 80 MMHG | HEIGHT: 77 IN | OXYGEN SATURATION: 94 % | BODY MASS INDEX: 37.19 KG/M2 | WEIGHT: 315 LBS | HEART RATE: 72 BPM

## 2020-02-14 DIAGNOSIS — M54.50 ACUTE RIGHT-SIDED LOW BACK PAIN WITHOUT SCIATICA: ICD-10-CM

## 2020-02-14 DIAGNOSIS — M54.9 ACUTE UPPER BACK PAIN: Primary | ICD-10-CM

## 2020-02-14 PROCEDURE — 99213 OFFICE O/P EST LOW 20 MIN: CPT | Performed by: INTERNAL MEDICINE

## 2020-02-14 RX ORDER — TIZANIDINE 2 MG/1
2 TABLET ORAL 3 TIMES DAILY PRN
Qty: 30 TABLET | Refills: 0 | Status: SHIPPED | OUTPATIENT
Start: 2020-02-14 | End: 2020-06-04

## 2020-02-14 RX ORDER — FAMOTIDINE 20 MG/1
20 TABLET, FILM COATED ORAL 2 TIMES DAILY
COMMUNITY
Start: 2020-02-14 | End: 2024-01-17

## 2020-02-14 RX ORDER — ACETAMINOPHEN 500 MG
500 TABLET ORAL EVERY 4 HOURS PRN
Qty: 30 TABLET | Refills: 0 | Status: SHIPPED | OUTPATIENT
Start: 2020-02-14 | End: 2020-06-04

## 2020-02-14 ASSESSMENT — MIFFLIN-ST. JEOR: SCORE: 2504.82

## 2020-02-14 NOTE — PROGRESS NOTES
"Subjective     Juan Alberto Menchaca is a 45 year old male who presents to clinic today for the following health issues:    This is worker's comp case  He works at [employer] as a    Works at [employer] in [place]    Per patient report that he was carrying boxes and  Twisted his back  He is having pain in his upper back between shoulder blades and right lumbar area.  Denied any nausea vomiting diarrhea or fever or any leg weakness  Has not tried anything except for heat    HPI   Musculoskeletal problem/pain        Duration: 1 day     Description  Location: Twisted wrong feels pain between shoulder blade and right lower back  Moderate     Intensity:  moderate    Accompanying signs and symptoms: tingling    History  Previous similar problem: no   Previous evaluation:  none    Precipitating or alleviating factors:  Trauma or overuse: YES  Aggravating factors include: lifting    Therapies tried and outcome: heat    Did not take tylenol or ibuprofen  Works full time 40 hours plus weeks  He is scheduled to work today tomorrow and Sunday Monday is off  He is supposed to return on Tuesday      Reviewed and updated as needed this visit by Provider         Review of Systems   ROS COMP: Constitutional, HEENT, cardiovascular, pulmonary, gi and gu systems are negative, except as otherwise noted.      Objective    /80 (BP Location: Right arm, Patient Position: Chair, Cuff Size: Adult Large)   Pulse 72   Temp 97.8  F (36.6  C) (Tympanic)   Ht 1.949 m (6' 4.75\")   Wt (!) 150.1 kg (331 lb)   SpO2 94%   BMI 39.51 kg/m    Body mass index is 39.51 kg/m .  Physical Exam   GENERAL: healthy, alert and no distress  MS: no gross musculoskeletal defects noted, no edema  MS: He is able to walk  Has mild tenderness in mid back and right scapular area  Slight tenderness later to spine in right lumbar area and   Straight leg raising test on the right side done  Lifting beyond 90 degrees causes some " discomfort in his lumbar area            Assessment & Plan     Juan Alberto was seen today for musculoskeletal problem.    Diagnoses and all orders for this visit:    Acute upper back pain  -     tiZANidine (ZANAFLEX) 2 MG tablet; Take 1 tablet (2 mg) by mouth 3 times daily as needed for muscle spasms  -     acetaminophen (TYLENOL) 500 MG tablet; Take 1 tablet (500 mg) by mouth every 4 hours as needed for mild pain  Most likely he sprained his back doing his job while he was caring boxes per his report  At this point there are no red flag sign but he has significant pain  We are hoping it will continue to improve  Icing as needed  Tizanidine and Tylenol as needed  He can return to work but he should not be doing 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  He brought the form which I completed but there was right side which was supposed to be completed by his supervisor  It was not completed as he told them later  He did not know that he would end up in coming to the clinic today  So I advised him to get this completed    Following letter was written for work restriction:  Patient was seen today in clinic due to work related injury.  He has upper back pain and right lumbar pain triggered by lifting boxes  I advised him to return to work today on 2/14/2020   Work 6 hours per day instead of 8 hours   Do light duty only until reevaluated on monday  No lifting of more than 10 lbs   No bending or stooping  No kneeling or climbing or twisting  No pulling or pushing   Prefer him to do desk work   Follow up on this Monday 2/17  May return to regular duty on 2/18 if pain resolves.  Follow-up on Monday so he can get be given letter to return to his regular duty    Acute right-sided low back pain without sciatica  -     tiZANidine (ZANAFLEX) 2 MG tablet; Take 1 tablet (2 mg) by mouth 3 times daily as needed for muscle spasms  -     acetaminophen (TYLENOL) 500 MG tablet; Take 1 tablet (500 mg) by mouth  "every 4 hours as needed for mild pain         BMI:   Estimated body mass index is 39.51 kg/m  as calculated from the following:    Height as of this encounter: 1.949 m (6' 4.75\").    Weight as of this encounter: 150.1 kg (331 lb).       Disclaimer: This note consists of symbols derived from keyboarding, dictation and/or voice recognition software. As a result, there may be errors in the script that have gone undetected. Please consider this when interpreting information found in this chart.      Patient Instructions   ICE prn   Take tizanidine 2 mg three times daily as needed   Take tylenol 500 mg every 4 hours as needed for pain.  Follow up on Monday with your PCP or team  Seek sooner medical attention if there is any worsening of symptoms or problems.        Return in about 3 days (around 2/17/2020).    Helen Chaparro MD  [facility]    "

## 2020-02-14 NOTE — PATIENT INSTRUCTIONS
ICE prn   Take tizanidine 2 mg three times daily as needed   Take tylenol 500 mg every 4 hours as needed for pain.  Follow up on Monday with your PCP or team  Seek sooner medical attention if there is any worsening of symptoms or problems.

## 2020-02-14 NOTE — LETTER
[place] [address] [address] [facility] [address]  Phone: [phone]    02/14/20    Juan Alberto Menchaca  [address]  [facility] [address]      To whom it may concern:     Patient was seen today in clinic due to work related injury.  He has upper back pain and right lumbar pain triggered by lifting boxes  I advised him to return to work today on 2/14/2020   Work 6 hours per day instead of 8 hours   Do light duty only until reevaluated on monday  No lifting of more than 10 lbs   No bending or stooping  No kneeling or climbing or twisting  No pulling or pushing   Prefer him to do desk work   Follow up on this Monday 2/17  May return to regular duty on 2/18 if pain resolves          Sincerely,      Helen Chaparro MD

## 2020-02-17 ENCOUNTER — OFFICE VISIT (OUTPATIENT)
Dept: FAMILY MEDICINE | Facility: CLINIC | Age: 45
End: 2020-02-17
Payer: COMMERCIAL

## 2020-02-17 VITALS
HEART RATE: 96 BPM | OXYGEN SATURATION: 94 % | BODY MASS INDEX: 37.19 KG/M2 | RESPIRATION RATE: 14 BRPM | TEMPERATURE: 97.9 F | HEIGHT: 77 IN | SYSTOLIC BLOOD PRESSURE: 108 MMHG | WEIGHT: 315 LBS | DIASTOLIC BLOOD PRESSURE: 72 MMHG

## 2020-02-17 DIAGNOSIS — M54.50 ACUTE RIGHT-SIDED LOW BACK PAIN WITHOUT SCIATICA: Primary | ICD-10-CM

## 2020-02-17 DIAGNOSIS — M54.9 ACUTE UPPER BACK PAIN: ICD-10-CM

## 2020-02-17 PROCEDURE — 99213 OFFICE O/P EST LOW 20 MIN: CPT | Performed by: FAMILY MEDICINE

## 2020-02-17 ASSESSMENT — MIFFLIN-ST. JEOR: SCORE: 2509.35

## 2020-02-17 NOTE — PROGRESS NOTES
Subjective     Juan Alberto Menchaca is a 45 year old male who presents to clinic today for the following health issues:    History of Present Illness        Back Pain:  He presents for follow up of back pain. Patient's back pain is a new problem.    Original cause of back pain: work related injury  First noticed back pain: in the last week        He eats 2-3 servings of fruits and vegetables daily.He consumes 1 sweetened beverage(s) daily.He exercises with enough effort to increase his heart rate 60 or more minutes per day.  He exercises with enough effort to increase his heart rate 6 days per week.   He is taking medications regularly.     Concern - Follow up on Injury at work      Description:   Pt says he is here to follow up on Injury which he had at work.02/13/2020. Muscle spasm between shoulder blades and right side pain    Intensity: none    Progression of Symptoms:  improving    Accompanying Signs & Symptoms:  none    Previous history of similar problem:   no  Therapies Tried and outcome: treated at [facility] on 02/04/2020    Follow up for injury on Thursday, was evaluated and provider thought that back pain was secondary to muscle spasms.  Given muscle relaxers and tylenol, as well as activity restriction.  Today patient reports that his back is good, no pain or tenderness anywhere, nothing unusual at this point.  Feels ok to return to work without directions.          Current Outpatient Medications   Medication Sig Dispense Refill     tiZANidine (ZANAFLEX) 2 MG tablet Take 1 tablet (2 mg) by mouth 3 times daily as needed for muscle spasms 30 tablet 0     acetaminophen (TYLENOL) 500 MG tablet Take 1 tablet (500 mg) by mouth every 4 hours as needed for mild pain 30 tablet 0     famotidine (PEPCID) 20 MG tablet Take 1 tablet (20 mg) by mouth 2 times daily       No Known Allergies    Reviewed and updated as needed this visit by Provider  Meds         Review of Systems   ROS COMP: Constitutional, HEENT,  "cardiovascular, pulmonary, gi and gu systems are negative, except as otherwise noted.      Objective    /72 (BP Location: Right arm, Patient Position: Chair, Cuff Size: Adult Large)   Pulse 96   Temp 97.9  F (36.6  C) (Oral)   Resp 14   Ht 1.949 m (6' 4.75\")   Wt (!) 150.6 kg (332 lb)   SpO2 94%   BMI 39.63 kg/m    Body mass index is 39.63 kg/m .  Physical Exam   GENERAL: healthy, alert and no distress  RESP: lungs clear to auscultation - no rales, rhonchi or wheezes  MS: no gross musculoskeletal defects noted, no edema.  No tenderness to palpation of entirety of back, specifically right scapular and lumbar regions.  Straight leg raise negative.            Assessment & Plan     1. Acute right-sided low back pain without sciatica  Pain is resolved.  Patient was advised on appropriate return precautions.  Provided with return to work note with full activity.  Otherwise follow-up as needed.    2. Acute upper back pain  As above in #1.  Follow-up as needed.           There are no Patient Instructions on file for this visit.    Return if symptoms worsen or fail to improve.    Cori Moss MD  [facility]    "

## 2020-02-17 NOTE — PROGRESS NOTES
Pre-Visit Planning     Future Appointments   Date Time Provider Department Center   2/17/2020 10:50 AM Coming Cori Moss MD [facility]   6/5/2020 11:00 AM Goltz, Bennett Ezra, PA-C [facility]     Arrival Time for this Appointment: 10:30 AM   Appointment Notes for this encounter:   F/U for W/C injury-was initally seen at [facility] on 2/14/2020    Questionnaires Reviewed/Assigned  No additional questionnaires are needed      Patient preferred phone number: [phone]    Patient contact not needed. Same day scheduling.    Rick Lara, EMT/Clinic Health Guide

## 2020-02-17 NOTE — LETTER
[facility]  [id number] [place] [address]  [phone]          February 17, 2020    RE:  Juan Alberto Menchaca                                                                                                                                                       [address] [facility] [address]            To whom it may concern:    Juan Alberto Menchaca is under my professional care and was seen in my clinic 2-.  He  may return to work with no restrictions.      Sincerely,        April Robina Moss MD

## 2020-03-22 ENCOUNTER — HEALTH MAINTENANCE LETTER (OUTPATIENT)
Age: 45
End: 2020-03-22

## 2020-06-04 VITALS — HEIGHT: 77 IN | WEIGHT: 315 LBS | BODY MASS INDEX: 37.19 KG/M2

## 2020-06-04 ASSESSMENT — MIFFLIN-ST. JEOR: SCORE: 2504.82

## 2020-06-04 NOTE — PATIENT INSTRUCTIONS
Your BMI is Body mass index is 39.51 kg/m .  Weight management is a personal decision.  If you are interested in exploring weight loss strategies, the following discussion covers the approaches that may be successful. Body mass index (BMI) is one way to tell whether you are at a healthy weight, overweight, or obese. It measures your weight in relation to your height.  A BMI of 18.5 to 24.9 is in the healthy range. A person with a BMI of 25 to 29.9 is considered overweight, and someone with a BMI of 30 or greater is considered obese. More than two-thirds of American adults are considered overweight or obese.  Being overweight or obese increases the risk for further weight gain. Excess weight may lead to heart disease and diabetes.  Creating and following plans for healthy eating and physical activity may help you improve your health.  Weight control is part of healthy lifestyle and includes exercise, emotional health, and healthy eating habits. Careful eating habits lifelong are the mainstay of weight control. Though there are significant health benefits from weight loss, long-term weight loss with diet alone may be very difficult to achieve- studies show long-term success with dietary management in less than 10% of people. Attaining a healthy weight may be especially difficult to achieve in those with severe obesity. In some cases, medications, devices and surgical management might be considered.  What can you do?  If you are overweight or obese and are interested in methods for weight loss, you should discuss this with your provider.     Consider reducing daily calorie intake by 500 calories.     Keep a food journal.     Avoiding skipping meals, consider cutting portions instead.    Diet combined with exercise helps maintain muscle while optimizing fat loss. Strength training is particularly important for building and maintaining muscle mass. Exercise helps reduce stress, increase energy, and improves fitness.  Increasing exercise without diet control, however, may not burn enough calories to loose weight.       Start walking three days a week 10-20 minutes at a time    Work towards walking thirty minutes five days a week     Eventually, increase the speed of your walking for 1-2 minutes at time    In addition, we recommend that you review healthy lifestyles and methods for weight loss available through the National Institutes of Health patient information sites:  http://win.niddk.nih.gov/publications/index.htm    And look into health and wellness programs that may be available through your health insurance provider, employer, local community center, or lindsay club.    Weight management plan: Patient was referred to their PCP to discuss a diet and exercise plan.

## 2020-06-05 ENCOUNTER — VIRTUAL VISIT (OUTPATIENT)
Dept: SLEEP MEDICINE | Facility: CLINIC | Age: 45
End: 2020-06-05
Payer: COMMERCIAL

## 2020-06-05 DIAGNOSIS — G47.33 OSA (OBSTRUCTIVE SLEEP APNEA): ICD-10-CM

## 2020-06-05 PROCEDURE — 99213 OFFICE O/P EST LOW 20 MIN: CPT | Mod: GT | Performed by: PHYSICIAN ASSISTANT

## 2020-06-05 NOTE — PROGRESS NOTES
"Juan Alberto Menchaca is a 45 year old male who is being evaluated via a billable video visit.      The patient has been notified of following:     \"This video visit will be conducted via a call between you and your physician/provider. We have found that certain health care needs can be provided without the need for an in-person physical exam.  This service lets us provide the care you need with a video conversation.  If a prescription is necessary we can send it directly to your pharmacy.  If lab work is needed we can place an order for that and you can then stop by our lab to have the test done at a later time.    Video visits are billed at different rates depending on your insurance coverage.  Please reach out to your insurance provider with any questions.    If during the course of the call the physician/provider feels a video visit is not appropriate, you will not be charged for this service.\"    Patient has given verbal consent for Video visit? Yes    How would you like to obtain your AVS? [facility]    Patient would like the video invitation sent by: Text to cell phone: [phone]    Will anyone else be joining your video visit? No      Mary Dupree MA on 6/4/2020 at 3:01 PM    CPAP Follow-Up Visit:    Date on this visit: 6/5/2020    Juan Alberto Menchaca has a follow-up of his CPAP use for severe HARIS. He was initially seen at the [facility] for snoring and observed periods where it looks like he is trying to catch his breath during sleep within the last year. His medical history is non-contributory.     HST results from 3/18/2019:  AHI 46.6/hr. AHI was 57.4 per hour supine, - per hour prone, 34.9 per hour on left side, and 59.8 per hour on right side. Baseline oxygen saturation was 90.8%.  Time with saturation less than or equal to 88% was 154 minutes. The lowest oxygen saturation was 72%.   Position: Percent of time spent: supine - 25%, prone - 0%, on left - 50.7%, on right - 24%.    PAP machine: " Respironics. Pressure settings: 7-17 cm    The compliance data shows that the patient used the CPAP for 28/30 nights, 85% of nights for >4 hours.  The 90th% pressure is 15.5 cm.  The average time in large leak is 0% of the night.  The average nightly usage is 439 min.  The average AHI is 0.7/hr.       Interface:  Mask: N20 nasal mask, gets new supplies about every 6 months. Uses SoClean daily.  Chin strap: No  Leak: No  Using Humidifier: Yes  Condensation in hose or mask: No     Difficulties with therapy:    [-] Snoring with CPAP:  [-] Difficulty tolerating the pressure: sometimes wishes it was a little higher when he first puts it on.  [-] Epistaxis/dry nose:   [-] Nasal congestion:  [+] Dry mouth: every once in a while, but less than when he first started.  [+] Mouth breathing: not often  [-] Pain/skin breakdown:      Improvements noted with CPAP: does not sleep well without it.  [+] waking up more refreshed  [+] sleeping better with less arousals  [-] nocturia improved, was never a problem   [+] improved energy level during the day    Weight change since sleep study: at 333 now. Was 331 in 4/2019.    Bedtime: 9:30-10 PM. Wake time 5:45-6 AM. Falls asleep quickly, rarely wakes at night. About 3-4 times per month he will wake before his alarm and have trouble getting back to sleep, maybe 5 AM. He does not take naps. On days off, his sleep schedule is the same.    Past medical/surgical history, family history, social history, medications and allergies were reviewed.      Problem List:  Patient Active Problem List    Diagnosis Date Noted     Morbid obesity (H) 05/22/2019     Priority: Medium     HARIS (obstructive sleep apnea) 03/20/2019     Priority: Medium     Severe HARIS          Impression/Plan:    (G47.33) HARIS (obstructive sleep apnea)  Comment: Doing well with CPAP. Usage is regular, AHI is in the target range, has trouble sleeping without it. Feels pressure could be a little higher when he first puts it on.  Occasionally wakes about an hour before his alarm.   Plan: Comprehensive DME        Order placed to increase pressure to 10-17 cm and for new supplies. We reviewed recommendations for cleaning and replacing supplies. He was advised to keep a bedtime closer to 10 PM rather than 9:30 PM to help prevent early awakenings.       He will follow up with me in about 2 year(s).     22 minutes (10:57-11:20 AM) spent with patient, all of which were spent face-to-face counseling, consulting, coordinating plan of care.      Bennett Goltz, PA-C    CC: No ref. provider found            Video-Visit Details    Type of service:  Video Visit    Video Start Time: 10:57 AM  Video End Time: 11:20 AM    Originating Location (pt. Location): Other car    Distant Location (provider location):  [facility]     Platform used for Video Visit: [employer] Bennett Ezra Goltz, PA-C, PAMehdiC

## 2020-09-10 NOTE — PATIENT INSTRUCTIONS
Preventive Health Recommendations  Male Ages 40 to 49    Yearly exam:             See your health care provider every year in order to  o   Review health changes.   o   Discuss preventive care.    o   Review your medicines if your doctor has prescribed any.    You should be tested each year for STDs (sexually transmitted diseases) if you re at risk.     Have a cholesterol test every 5 years.     Have a colonoscopy (test for colon cancer) if someone in your family has had colon cancer or polyps before age 50.     After age 45, have a diabetes test (fasting glucose). If you are at risk for diabetes, you should have this test every 3 years.      Talk with your health care provider about whether or not a prostate cancer screening test (PSA) is right for you.    Shots: Get a flu shot each year. Get a tetanus shot every 10 years.     Nutrition:    Eat at least 5 servings of fruits and vegetables daily.     Eat whole-grain bread, whole-wheat pasta and brown rice instead of white grains and rice.     Get adequate Calcium and Vitamin D.     Lifestyle    Exercise for at least 150 minutes a week (30 minutes a day, 5 days a week). This will help you control your weight and prevent disease.     Limit alcohol to one drink per day.     No smoking.     Wear sunscreen to prevent skin cancer.     See your dentist every six months for an exam and cleaning.
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
## 2020-09-14 ENCOUNTER — OFFICE VISIT (OUTPATIENT)
Dept: FAMILY MEDICINE | Facility: CLINIC | Age: 45
End: 2020-09-14
Payer: COMMERCIAL

## 2020-09-14 VITALS
BODY MASS INDEX: 37.19 KG/M2 | WEIGHT: 315 LBS | RESPIRATION RATE: 16 BRPM | SYSTOLIC BLOOD PRESSURE: 138 MMHG | HEART RATE: 82 BPM | OXYGEN SATURATION: 96 % | TEMPERATURE: 97.2 F | DIASTOLIC BLOOD PRESSURE: 88 MMHG | HEIGHT: 77 IN

## 2020-09-14 DIAGNOSIS — Z00.00 ROUTINE GENERAL MEDICAL EXAMINATION AT A HEALTH CARE FACILITY: Primary | ICD-10-CM

## 2020-09-14 DIAGNOSIS — E66.01 MORBID OBESITY (H): ICD-10-CM

## 2020-09-14 DIAGNOSIS — G47.33 OSA (OBSTRUCTIVE SLEEP APNEA): ICD-10-CM

## 2020-09-14 DIAGNOSIS — L98.9 SKIN LESION: ICD-10-CM

## 2020-09-14 PROCEDURE — 99396 PREV VISIT EST AGE 40-64: CPT | Performed by: PHYSICIAN ASSISTANT

## 2020-09-14 ASSESSMENT — ENCOUNTER SYMPTOMS
CONSTIPATION: 0
PARESTHESIAS: 0
NAUSEA: 0
HEARTBURN: 0
SORE THROAT: 0
SHORTNESS OF BREATH: 0
EYE PAIN: 0
ARTHRALGIAS: 1
JOINT SWELLING: 1
FEVER: 0
DIZZINESS: 0
NERVOUS/ANXIOUS: 0
CHILLS: 0
MYALGIAS: 0
HEMATOCHEZIA: 0
DIARRHEA: 0
FREQUENCY: 0
ABDOMINAL PAIN: 0
PALPITATIONS: 0
WEAKNESS: 0
HEADACHES: 0
COUGH: 0
DYSURIA: 0
HEMATURIA: 0

## 2020-09-14 ASSESSMENT — MIFFLIN-ST. JEOR: SCORE: 2569.23

## 2020-09-25 DIAGNOSIS — E66.01 MORBID OBESITY (H): ICD-10-CM

## 2020-09-25 DIAGNOSIS — Z00.00 ROUTINE GENERAL MEDICAL EXAMINATION AT A HEALTH CARE FACILITY: ICD-10-CM

## 2020-09-25 LAB
ALBUMIN SERPL-MCNC: 3.9 G/DL (ref 3.4–5)
ALP SERPL-CCNC: 78 U/L (ref 40–150)
ALT SERPL W P-5'-P-CCNC: 62 U/L (ref 0–70)
ANION GAP SERPL CALCULATED.3IONS-SCNC: 7 MMOL/L (ref 3–14)
AST SERPL W P-5'-P-CCNC: 32 U/L (ref 0–45)
BILIRUB SERPL-MCNC: 0.5 MG/DL (ref 0.2–1.3)
BUN SERPL-MCNC: 18 MG/DL (ref 7–30)
CALCIUM SERPL-MCNC: 9.6 MG/DL (ref 8.5–10.1)
CHLORIDE SERPL-SCNC: 107 MMOL/L (ref 94–109)
CHOLEST SERPL-MCNC: 237 MG/DL
CO2 SERPL-SCNC: 23 MMOL/L (ref 20–32)
CREAT SERPL-MCNC: 0.82 MG/DL (ref 0.66–1.25)
GFR SERPL CREATININE-BSD FRML MDRD: >90 ML/MIN/{1.73_M2}
GLUCOSE SERPL-MCNC: 86 MG/DL (ref 70–99)
HDLC SERPL-MCNC: 47 MG/DL
LDLC SERPL CALC-MCNC: 148 MG/DL
NONHDLC SERPL-MCNC: 190 MG/DL
POTASSIUM SERPL-SCNC: 3.9 MMOL/L (ref 3.4–5.3)
PROT SERPL-MCNC: 7.5 G/DL (ref 6.8–8.8)
SODIUM SERPL-SCNC: 137 MMOL/L (ref 133–144)
TRIGL SERPL-MCNC: 212 MG/DL

## 2020-09-25 PROCEDURE — 80053 COMPREHEN METABOLIC PANEL: CPT | Performed by: PHYSICIAN ASSISTANT

## 2020-09-25 PROCEDURE — 36415 COLL VENOUS BLD VENIPUNCTURE: CPT | Performed by: PHYSICIAN ASSISTANT

## 2020-09-25 PROCEDURE — 80061 LIPID PANEL: CPT | Performed by: PHYSICIAN ASSISTANT

## 2021-01-15 ENCOUNTER — HEALTH MAINTENANCE LETTER (OUTPATIENT)
Age: 46
End: 2021-01-15

## 2021-03-25 ENCOUNTER — VIRTUAL VISIT (OUTPATIENT)
Dept: FAMILY MEDICINE | Facility: CLINIC | Age: 46
End: 2021-03-25
Payer: COMMERCIAL

## 2021-03-25 DIAGNOSIS — Z20.822 EXPOSURE TO COVID-19 VIRUS: Primary | ICD-10-CM

## 2021-03-25 PROCEDURE — 99213 OFFICE O/P EST LOW 20 MIN: CPT | Mod: 95 | Performed by: FAMILY MEDICINE

## 2021-03-25 NOTE — LETTER
[facility]  [address] [facility] [address]  Phone: [phone]    03/25/21    Juan Alberto Menchaca  [address] [address] [facility] [address]      To whom it may concern:     Juan Alber[facility].     His son was diagnosed with COVID-19 8 days ago.     Per current CDC guidelines, Juan Alberto must quarantine for 14 days from the time he last had contact with his son, which was March 20th. This means he can safely return to work April 3rd, assuming he has no symptoms and ideally has a negative COVID-19 test himself.       Sincerely,        Azul Mccauley MD

## 2021-03-25 NOTE — PROGRESS NOTES
Juan Alberto is a 46 year old who is being evaluated via a billable video visit.      How would you like to obtain your AVS? MyChart  If the video visit is dropped, the invitation should be resent by: Text to cell phone: [phone]  Will anyone else be joining your video visit? No      Video Start Time: 11:39 AM    Assessment & Plan     Exposure to COVID-19 virus - letter provided today, COVID test ordered, advised he should schedule this 3 days prior to RTW.    - Asymptomatic COVID-19 Virus (Coronavirus) by PCR; Future      Return in about 6 months (around 9/25/2021) for Yearly Preventive Exam with his PCP.    Auzl Mccauley MD  [facility]   Juan Alberto is a 46 year old who presents for the following health issues     History of Present Illness       He eats 0-1 servings of fruits and vegetables daily.He consumes 0 sweetened beverage(s) daily.He exercises with enough effort to increase his heart rate 60 or more minutes per day.  He exercises with enough effort to increase his heart rate 5 days per week.   He is taking medications regularly.       Patients son tested positive 8 days ago in [place]. Drove home with him until March 20th.     Son has been isolating at home.     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    Patient is set up to take a covid test at the [facility] clinic in [place].    Asymptomatic.    Has not had COVID himself and has not been vaccinated.       Review of Systems   Constitutional, HEENT, cardiovascular, pulmonary, gi and gu systems are negative, except as otherwise noted.      Objective           Vitals:  No vitals were obtained today due to virtual visit.    Physical Exam   GENERAL: Healthy, alert and no distress  EYES: Eyes grossly normal to inspection.  No discharge or erythema, or obvious scleral/conjunctival abnormalities.  RESP: No audible wheeze, cough, or visible cyanosis.  No visible retractions or increased work  of breathing.    SKIN: Visible skin clear. No significant rash, abnormal pigmentation or lesions.  NEURO: Cranial nerves grossly intact.  Mentation and speech appropriate for age.  PSYCH: Mentation appears normal, affect normal/bright, judgement and insight intact, normal speech and appearance well-groomed.          Video-Visit Details    Type of service:  Video Visit    Video End Time: 11:47 AM    Originating Location (pt. Location): Home    Distant Location (provider location):  [facility]     Platform used for Video Visit: [employer]

## 2021-03-31 DIAGNOSIS — Z20.822 EXPOSURE TO COVID-19 VIRUS: ICD-10-CM

## 2021-03-31 LAB
LABORATORY COMMENT REPORT: NORMAL
SARS-COV-2 RNA RESP QL NAA+PROBE: NEGATIVE
SARS-COV-2 RNA RESP QL NAA+PROBE: NORMAL
SPECIMEN SOURCE: NORMAL
SPECIMEN SOURCE: NORMAL

## 2021-03-31 PROCEDURE — U0003 INFECTIOUS AGENT DETECTION BY NUCLEIC ACID (DNA OR RNA); SEVERE ACUTE RESPIRATORY SYNDROME CORONAVIRUS 2 (SARS-COV-2) (CORONAVIRUS DISEASE [COVID-19]), AMPLIFIED PROBE TECHNIQUE, MAKING USE OF HIGH THROUGHPUT TECHNOLOGIES AS DESCRIBED BY CMS-2020-01-R: HCPCS | Performed by: FAMILY MEDICINE

## 2021-03-31 PROCEDURE — U0005 INFEC AGEN DETEC AMPLI PROBE: HCPCS | Performed by: FAMILY MEDICINE

## 2021-05-26 ENCOUNTER — RECORDS - HEALTHEAST (OUTPATIENT)
Dept: ADMINISTRATIVE | Facility: CLINIC | Age: 46
End: 2021-05-26

## 2021-05-30 VITALS — WEIGHT: 282.31 LBS | BODY MASS INDEX: 33.48 KG/M2

## 2021-06-08 NOTE — PROGRESS NOTES
ASSESSMENT AND PLAN:  1. Left elbow pain Denies rash plain radiographs of left elbow today and no evidence for acute fracture information was shared with patient.  Continued use of ice pack continues ibuprofen as needed   XR Elbow Left 3 or More VWS   2. Muscle spasm of left shoulder is tenderness of his left shoulder extends from the posterior aspect of left deltoid to the left rhomboid area.  Given a day off from work so he can take muscle relaxants can try an ice pack or icy hot.  He can use ibuprofen for pain control.          CHIEF COMPLAINT:  Chief Complaint   Patient presents with     Fall     was walking down the pavement and slipped and landed on L arm and upper back per pt, happened on 02/07/2017.       HISTORY OF PRESENT ILLNESS:  Juan Alberto is a 42 y.o. male presenting for an injury after a fall. On 02/07/2017, he was walking up an incline of a pavement when he slipped and landed on his left elbow and upper back. His left elbow was swollen after the injury and he was able to work yesterday but noted it was painful to move. He denies numbness over his elbow and hand. He has tenderness over his left deltoid and serratus interior. He has been taking 800mg of ibuprofen for relief. He has had no previous elbow or back injuries.     REVIEW OF SYSTEMS:   He denies headache.   All other 10 point review of systems are negative.    PFSH:  Reviewed as below.     TOBACCO USE:  History   Smoking Status     Former Smoker     Quit date: 7/22/2011   Smokeless Tobacco     Never Used       VITALS:  Vitals:    02/09/17 0921   BP: 124/82   Patient Site: Left Arm   Patient Position: Sitting   Cuff Size: Adult Large   Pulse: 68   Resp: 20   Temp: 97.8  F (36.6  C)   TempSrc: Oral   Weight: (!) 282 lb 5 oz (128.1 kg)     Wt Readings from Last 3 Encounters:   02/09/17 (!) 282 lb 5 oz (128.1 kg)   07/22/15 (!) 294 lb 0.6 oz (133.4 kg)     Body mass index is 33.48 kg/(m^2).     PHYSICAL EXAM:  General: Alert, cooperative, no  distress, appears stated age  Head: Normocephalic, without obvious abnormality, atraumatic  Back: Symmetric, no curvature, ROM normal, no CVA tenderness  Musculoskeletal: Tenderness over Left olecranon process, left posterior portion of deltoid and left serratus interior.    Minor swelling over left antecubital fossa  Neurologic:  A & O x 3.  No tremor, no focal findings.       Left elbow x-ray: Soft tissue swelling present.  No fractures noted.     DATA REVIEWED:  Additional History from Old Records Summarized (2): Reviewed results from his last physical with   Decision to Obtain Records (1): None  Radiology Tests Summarized or Ordered (1): Left elbow x-ray ordered.    Labs Reviewed or Ordered (1): None  Medicine Test Summarized or Ordered (1): None  Independent Review of EKG or X-RAY(2 each): Personally interpreted elbow x-ray found no evidence of a fracture    The visit lasted a total of 10 minutes face to face with the patient. Over 50% of the time was spent counseling and educating the patient about elbow pain.     IDeven, am scribing for and in the presence of, Dr. Noonan.    IDr. Noonan, personally performed the services described in this documentation, as scribed by Deven Dickerson in my presence, and it is both accurate and complete.      MEDICATIONS:  No current outpatient prescriptions on file.     No current facility-administered medications for this visit.        Total Data Points: 5

## 2021-06-24 NOTE — TELEPHONE ENCOUNTER
The patient is listing Dr. Mcdowell as a primary care provider. Dr. Mcdowell retired from clinic practice December 2018. The patient is due to establish care with a new provider. The writer intends to contact the patient to assist with the scheduling process. If the patient has established care elsewhere, please discontinue Jeremias as the primary physician and close.

## 2021-07-26 ENCOUNTER — ALLIED HEALTH/NURSE VISIT (OUTPATIENT)
Dept: FAMILY MEDICINE | Facility: CLINIC | Age: 46
End: 2021-07-26
Payer: COMMERCIAL

## 2021-07-26 ENCOUNTER — VIRTUAL VISIT (OUTPATIENT)
Dept: FAMILY MEDICINE | Facility: CLINIC | Age: 46
End: 2021-07-26
Payer: COMMERCIAL

## 2021-07-26 DIAGNOSIS — J02.9 SORE THROAT: ICD-10-CM

## 2021-07-26 DIAGNOSIS — J02.9 SORE THROAT: Primary | ICD-10-CM

## 2021-07-26 LAB
DEPRECATED S PYO AG THROAT QL EIA: NEGATIVE
GROUP A STREP BY PCR: NOT DETECTED

## 2021-07-26 PROCEDURE — 87651 STREP A DNA AMP PROBE: CPT

## 2021-07-26 PROCEDURE — 99213 OFFICE O/P EST LOW 20 MIN: CPT | Mod: 95 | Performed by: FAMILY MEDICINE

## 2021-07-26 NOTE — PATIENT INSTRUCTIONS
Patient Education     Self-Care for Sore Throats     Sore throats happen for many reasons, such as colds, allergies, cigarette smoke, air pollution, and infections caused by viruses or bacteria. In any case, your throat becomes red and sore. Your goal for self-care is to reduce your discomfort while giving your throat a chance to heal.  Moisten and soothe your throat  Tips include the following:    Try a sip of water first thing after waking up.    Keep your throat moist by drinking 6 or more glasses of clear liquids every day.    Run a cool-air humidifier in your room overnight.    Stay away from cigarette smoke.     Check the air quality index,if air pollution gives you a sore throat. On high pollution days, try to limit outdoor time.    Suck on throat lozenges, cough drops, hard candy, ice chips, or frozen fruit-juice bars. Use the sugar-free versions if your diet or medical condition requires them.  Gargle to ease irritation  Gargling every hour or 2 can ease irritation. Try gargling with 1 of these solutions:    1/4 teaspoon of salt in 1/2 cup of warm water    An over-the-counter anesthetic gargle  Use medicine for more relief  Over-the-counter medicine can reduce sore throat symptoms. Ask your pharmacist if you have questions about which medicine to use. To prevent possible medicine interactions, let the pharmacist know what medicines you take. To decrease symptoms:    Ease pain with anesthetic sprays. Aspirin or an aspirin substitute also helps. Remember, never give aspirin to anyone 18 or younger. Don't take aspirin if you are already taking blood thinners.     For sore throats caused by allergies, try antihistamines to block the allergic reaction.  Unless a sore throat is caused by a bacterial infection, antibiotics won t help you.  Prevent future sore throats  Prevention tips include:    Stop smoking or reduce contact with secondhand smoke. Smoke irritates the tender throat lining.    Limit contact with  pets and with allergy-causing substances, such as pollen and mold.    Wash your hands often when you re around someone with a sore throat or cold. This will keep viruses or bacteria from spreading.    Limit outdoor time when air pollution is bad.    Don t strain your vocal cords.  When to call your healthcare provider  Contact your healthcare provider if you have:    Fever of 100.4 F (38.0 C) or higher, or as directed by your healthcare provider    White spots on the throat    Great Trouble swallowing    A skin rash    Recent exposure to someone else with strep bacteria    Severe hoarseness and swollen glands in the neck or jaw  Call 911  Call 911 if any of the following occur:    Trouble breathing or catching your breath    Drooling and problems swallowing    Wheezing    Unable to talk    Feeling dizzy or faint    Feeling of doom  [employer] last reviewed this educational content on 9/1/2019 2000-2021 [employer]. All rights reserved. This information is not intended as a substitute for professional medical care. Always follow your healthcare professional's instructions.

## 2021-07-26 NOTE — PROGRESS NOTES
Juan Alberto is a 46 year old who is being evaluated via a billable telephone visit.      What phone number would you like to be contacted at? [phone]  How would you like to obtain your AVS? MyChart    Assessment & Plan     (J02.9) Sore throat  (primary encounter diagnosis)  Comment:  Plan: Streptococcus A Rapid Scr w Reflx to PCR - Lab         Collect     possible URI . Order placed for rapid strep , treat only if positive. He is also scheduled for covid teat already later.  Although talked about covid precautions, preventing the spread , isolation etc,  just to be on safe side .   Talked about warning s/s for which should be seen   Cares and symptomatic treatment discussed.    Follow up if problem.       Misty Cosby MD  [facility]   Juan Alberto is a 46 year old who presents for the following health issues     HPI     Acute Illness  Acute illness concerns: Sore throat, cough, congestion since yesterday , butt sx feeling  slightly worse this nm  Onset/Duration: 2:30am today  Symptoms:  Fever: no  Chills/Sweats: no  Headache (location?): no  Sinus Pressure: no  Conjunctivitis:  no  Ear Pain: no  Rhinorrhea: no  Congestion: YES  Sore Throat: YES  Cough: YES-non-productive, barking  Wheeze: no  Decreased Appetite: no  Nausea: no  Vomiting: no  Diarrhea: no  Dysuria/Freq.: no  Dysuria or Hematuria: no  Fatigue/Achiness: no  Sick/Strep Exposure: no strep exposure but has hx of strep a long time ago.  he wants  scheduled for rapid test later in evening  He has no  known  Exposure to covid. No covid vaccine yet and he does not wants covid vaccin , but he Is scheduled for covid test later at [facility]   Therapies tried and outcome: None      Review of Systems   Constitutional, HEENT, cardiovascular, pulmonary, GI, , musculoskeletal, neuro, skin, endocrine and psych systems are negative, except as otherwise noted.      Objective           Vitals:  No vitals were obtained today  due to virtual visit.    Physical Exam   healthy, alert and no distress  PSYCH: Alert and oriented times 3; coherent speech, normal   rate and volume, able to articulate logical thoughts, able   to abstract reason, no tangential thoughts, no hallucinations   or delusions  His affect is normal  RESP: No cough, no audible wheezing, able to talk in full sentences  Remainder of exam unable to be completed due to telephone visits            Phone call duration: 13  minutes

## 2021-10-24 ENCOUNTER — HEALTH MAINTENANCE LETTER (OUTPATIENT)
Age: 46
End: 2021-10-24

## 2021-12-15 ENCOUNTER — OFFICE VISIT (OUTPATIENT)
Dept: PEDIATRICS | Facility: CLINIC | Age: 46
End: 2021-12-15
Payer: COMMERCIAL

## 2021-12-15 VITALS
TEMPERATURE: 98.2 F | SYSTOLIC BLOOD PRESSURE: 132 MMHG | WEIGHT: 315 LBS | HEIGHT: 77 IN | OXYGEN SATURATION: 98 % | RESPIRATION RATE: 20 BRPM | BODY MASS INDEX: 37.19 KG/M2 | HEART RATE: 78 BPM | DIASTOLIC BLOOD PRESSURE: 84 MMHG

## 2021-12-15 DIAGNOSIS — B99.9 INFECTIOUS FOLLICULITIS: Primary | ICD-10-CM

## 2021-12-15 DIAGNOSIS — L73.8 INFECTIOUS FOLLICULITIS: Primary | ICD-10-CM

## 2021-12-15 PROCEDURE — 99213 OFFICE O/P EST LOW 20 MIN: CPT | Performed by: NURSE PRACTITIONER

## 2021-12-15 RX ORDER — CEPHALEXIN 500 MG/1
500 CAPSULE ORAL 4 TIMES DAILY
Qty: 28 CAPSULE | Refills: 0 | Status: SHIPPED | OUTPATIENT
Start: 2021-12-15 | End: 2021-12-22

## 2021-12-15 ASSESSMENT — MIFFLIN-ST. JEOR: SCORE: 2540.64

## 2021-12-15 NOTE — PROGRESS NOTES
"Assessment & Plan     Infectious folliculitis  Discussed s/s to watch for of worsening infection, otherwise follow-up if not improving in 2-3 days.   - cephALEXin (KEFLEX) 500 MG capsule; Take 1 capsule (500 mg) by mouth 4 times daily for 7 days      Patient Instructions   Warm compress to the area 3-4 x per day  Antibiotic 4x per day x1 week  Don't pick at it      Return in about 2 days (around 12/17/2021), or if symptoms worsen or fail to improve.    Michelle Srivastava NP  [facility] SHIRLEY Avendano is a 46 year old who presents for the following health issues     HPI   Concern - Mass  Onset: 1 week  Description: middle of chest, hard lump growing   Intensity: mild  Progression of Symptoms:  worsening  Accompanying Signs & Symptoms: none  Previous history of similar problem: none  Precipitating factors:        Worsened by: none  Alleviating factors:        Improved by: none  Therapies tried and outcome: None    Denies fever, chills, feels well otherwise  Feels \"pressure\" when touched but no painful  Started as a pimple, then became red and enlarged  Denies any hx of immunocompromising diseases    Review of Systems   Constitutional, HEENT, cardiovascular, pulmonary, gi and gu systems are negative, except as otherwise noted.      Objective    /84 (BP Location: Right arm, Patient Position: Chair, Cuff Size: Adult Large)   Pulse 78   Temp 98.2  F (36.8  C) (Tympanic)   Resp 20   Ht 1.949 m (6' 4.75\")   Wt (!) 154.2 kg (340 lb)   SpO2 98%   BMI 40.58 kg/m    Body mass index is 40.58 kg/m .  Physical Exam   GENERAL: healthy, alert and no distress  SKIN: erythematous firm nodule to midsternum with small superficial pustule to middle of nodule, minimally tender on palpation, normal temp              "

## 2022-01-22 ENCOUNTER — NURSE TRIAGE (OUTPATIENT)
Dept: NURSING | Facility: CLINIC | Age: 47
End: 2022-01-22
Payer: COMMERCIAL

## 2022-01-22 NOTE — TELEPHONE ENCOUNTER
"Coronavirus (COVID-19) Notification    Caller Name (Patient, parent, daughter/son, grandparent, etc)  Juan Alberto Menchaca    Reason for call  Notify of Positive Coronavirus (COVID-19) lab results, assess symptoms,  review  [facility] recommendations    Lab Result    Lab test:  2019-nCoV rRt-PCR or SARS-CoV-2 PCR    Oropharyngeal AND/OR nasopharyngeal swabs is POSITIVE for 2019-nCoV RNA/SARS-COV-2 PCR (COVID-19 virus)    RN Recommendations/Instructions per [facility] Coronavirus COVID-19 recommendations    Brief introduction script  Introduce self then review script:  \"I am calling on behalf of [employer].  We were notified that your Coronavirus test (COVID-19) for was POSITIVE for the virus.  I have some information to relay to you but first I wanted to mention that the [place] Dept of Health will be contacting you shortly [it's possible MD already called Patient] to talk to you more about how you are feeling and other people you have had contact with who might now also have the virus.  Also,  [facility] is Partnering with the [facility] for Covid-19 research, you may be contacted directly by research staff.\"    Assessment (Inquire about Patient's current symptoms)   Assessment   Current Symptoms at time of phone call: (if no symptoms, document No symptoms] cough   Symptoms onset (if applicable) 01/21/22     If at time of call, Patients symptoms hare worsened, the Patient should contact 911 or have someone drive them to Emergency Dept promptly:      If Patient calling 911, inform 911 personal that you have tested positive for the Coronavirus (COVID-19).  Place mask on and await 911 to arrive.    If Emergency Dept, If possible, please have another adult drive you to the Emergency Dept but you need to wear mask when in contact with other people.      Monoclonal Antibody Administration    Is the patient symptomatic at the time of result notification? No.  Does the patient fit the criteria: Patient " declined    Review information with Patient    Your result was positive. This means you have COVID-19 (coronavirus).  We have sent you a letter that reviews the information that I'll be reviewing with you now.    How can I protect others?    If you have symptoms: stay home and away from others (self-isolate) until:    You've had no fever--and no medicine that reduces fever--for 1 full day (24 hours). And       Your other symptoms have gotten better. For example, your cough or breathing has improved. And     At least 10 days have passed since your symptoms started. (If you've been told by a doctor that you have a weak immune system, wait 20 days.)     If you don't have symptoms: Stay home and away from others (self-isolate) until at least 10 days have passed since your first positive COVID-19 test. (Date test collected)    During this time:    Stay in your own room, including for meals. Use your own bathroom if you can.    Stay away from others in your home. No hugging, kissing or shaking hands. No visitors.     Don't go to work, school or anywhere else.     Clean  high touch  surfaces often (doorknobs, counters, handles, etc.). Use a household cleaning spray or wipes. You'll find a full list on the EPA website at www.epa.gov/pesticide-registration/list-n-disinfectants-use-against-sars-cov-2.     Cover your mouth and nose with a mask, tissue or other face covering to avoid spreading germs.    Wash your hands and face often with soap and water.    Make a list of people you have been in close contact with recently, even if either of you wore a face covering.   - Start your list from 2 days before you became ill or had a positive test.  - Include anyone that was within 6 feet of you for a cumulative total of 15 minutes or more in 24 hours. (Example: if you sat next to Alphonse for 5 minutes in the morning and 10 minutes in the afternoon, then you were in close contact for 15 minutes total that day. Alphonse would be added to  your list.)    A public health worker will call or text you. It is important that you answer. They will ask you questions about possible exposures to COVID-19, such as people you have been in direct contact with and places you have visited.    Tell the people on your list that you have COVID-19; they should stay away from others for 14 days starting from the last time they were in contact with you (unless you are told something different from a public health worker).     Caregivers in these groups are at risk for severe illness due to COVID-19:  o People 65 years and older  o People who live in a nursing home or long-term care facility  o People with chronic disease (lung, heart, cancer, diabetes, kidney, liver, immunologic)  o People who have a weakened immune system, including those who:  - Are in cancer treatment  - Take medicine that weakens the immune system, such as corticosteroids  - Had a bone marrow or organ transplant  - Have an immune deficiency  - Have poorly controlled HIV or AIDS  - Are obese (body mass index of 40 or higher)  - Smoke regularly    Caregivers should wear gloves while washing dishes, handling laundry and cleaning bedrooms and bathrooms.    Wash and dry laundry with special caution. Don't shake dirty laundry, and use the warmest water setting you can.    If you have a weakened immune system, ask your doctor about other actions you should take.    For more tips, go to www.cdc.gov/coronavirus/2019-ncov/downloads/10Things.pdf.    You should not go back to work until you meet the guidelines above for ending your home isolation. You don't need to be retested for COVID-19 before going back to work--studies show that you won't spread the virus if it's been at least 10 days since your symptoms started (or 20 days, if you have a weak immune system).    Employers: This document serves as formal notice of your employee's medical guidelines for going back to work. They must meet the above guidelines  before going back to work in person.    How can I take care of myself?    1. Get lots of rest. Drink extra fluids (unless a doctor has told you not to).    2. Take Tylenol (acetaminophen) for fever or pain. If you have liver or kidney problems, ask your family doctor if it's okay to take Tylenol.     Take either:     650 mg (two 325 mg pills) every 4 to 6 hours, or     1,000 mg (two 500 mg pills) every 8 hours as needed.     Note: Don't take more than 3,000 mg in one day. Acetaminophen is found in many medicines (both prescribed and over-the-counter medicines). Read all labels to be sure you don't take too much.    For children, check the Tylenol bottle for the right dose (based on their age or weight).    3. If you have other health problems (like cancer, heart failure, an organ transplant or severe kidney disease): Call your specialty clinic if you don't feel better in the next 2 days.    4. Know when to call 911: Emergency warning signs include:    Trouble breathing or shortness of breath    Pain or pressure in the chest that doesn't go away    Feeling confused like you haven't felt before, or not being able to wake up    Bluish-colored lips or face    5. Sign up for [employer]. We know it's scary to hear that you have COVID-19. We want to track your symptoms to make sure you're okay over the next 2 weeks. Please look for an email from [employer]--this is a free, online program that we'll use to keep in touch. To sign up, follow the link in the email. Learn more at [email or web address].    Where can I get more information?    [facility]: [email or web address]    Coronavirus Basics: www.health.[facility].[place]/diseases/coronavirus/basics.html    What to Do If You're Sick: www.cdc.gov/coronavirus/2019-ncov/about/steps-when-sick.html    Ending Home Isolation: www.cdc.gov/coronavirus/2019-ncov/hcp/disposition-in-home-patients.html     Caring for Someone with COVID-19:  www.cdc.gov/coronavirus/2019-ncov/if-you-are-sick/care-for-someone.html     [facility] clinical trials (COVID-19 research studies): [email or web address]-[email or web address]-trials     A Positive COVID-19 letter will be sent via [employer] or the mail. (Exception, no letters sent to Presurgerical/Preprocedure Patients)    Sapna Nunez RN    Reason for Disposition    [1] COVID-19 diagnosed by positive lab test AND [2] mild symptoms (e.g., cough, fever, others) AND [3] no complications or SOB    Additional Information    Negative: SEVERE difficulty breathing (e.g., struggling for each breath, speaks in single words)    Negative: Difficult to awaken or acting confused (e.g., disoriented, slurred speech)    Negative: Bluish (or gray) lips or face now    Negative: Shock suspected (e.g., cold/pale/clammy skin, too weak to stand, low BP, rapid pulse)    Negative: Sounds like a life-threatening emergency to the triager    Negative: [1] COVID-19 exposure AND [2] no symptoms    Negative: COVID-19 vaccine reaction suspected (e.g., fever, headache, muscle aches) occurring 1 to 3 days after getting vaccine    Negative: COVID-19 vaccine, questions about    Negative: [1] Lives with someone known to have influenza (flu test positive) AND [2] flu-like symptoms (e.g., cough, runny nose, sore throat, SOB; with or without fever)    Negative: [1] Adult with possible COVID-19 symptoms AND [2] triager concerned about severity of symptoms or other causes    Negative: COVID-19 and breastfeeding, questions about    Negative: SEVERE or constant chest pain or pressure (Exception: mild central chest pain, present only when coughing)    Negative: MODERATE difficulty breathing (e.g., speaks in phrases, SOB even at rest, pulse 100-120)    Negative: [1] Headache AND [2] stiff neck (can't touch chin to chest)    Negative: MILD difficulty breathing (e.g., minimal/no SOB at rest, SOB with walking, pulse <100)    Negative: Chest pain or  pressure    Negative: Patient sounds very sick or weak to the triager    Negative: Fever > 103 F (39.4 C)    Negative: [1] Fever > 101 F (38.3 C) AND [2] age > 60 years    Negative: [1] Fever > 100.0 F (37.8 C) AND [2] bedridden (e.g., nursing home patient, CVA, chronic illness, recovering from surgery)    Negative: HIGH RISK for severe COVID complications (e.g., age > 64 years, obesity with BMI > 25, pregnant, chronic lung disease or other chronic medical condition)  (Exception: Already seen by PCP and no new or worsening symptoms.)    Negative: [1] HIGH RISK patient AND [2] influenza is widespread in the community AND [3] ONE OR MORE respiratory symptoms: cough, sore throat, runny or stuffy nose    Negative: [1] HIGH RISK patient AND [2] influenza exposure within the last 7 days AND [3] ONE OR MORE respiratory symptoms: cough, sore throat, runny or stuffy nose    Negative: [1] COVID-19 infection suspected by caller or triager AND [2] mild symptoms (cough, fever, or others) AND [3] negative COVID-19 rapid test    Negative: Fever present > 3 days (72 hours)    Negative: [1] Fever returns after gone for over 24 hours AND [2] symptoms worse or not improved    Negative: [1] Continuous (nonstop) coughing interferes with work or school AND [2] no improvement using cough treatment per protocol    Negative: Cough present > 3 weeks    Negative: [1] COVID-19 diagnosed by positive lab test AND [2] NO symptoms (e.g., cough, fever, others)    Protocols used: CORONAVIRUS (COVID-19) DIAGNOSED OR [id number] 8.25.2021

## 2022-05-17 ENCOUNTER — NURSE TRIAGE (OUTPATIENT)
Dept: NURSING | Facility: CLINIC | Age: 47
End: 2022-05-17
Payer: COMMERCIAL

## 2022-05-17 NOTE — CONFIDENTIAL NOTE
Patient says he has a abscess on the right side. Face is slightly swollen  Triage guidelines recommend to go to office now  Caller refused disposition, wants message sent to provider

## 2022-05-18 NOTE — TELEPHONE ENCOUNTER
Provider Response to 2nd Level Triage Request    I have reviewed the RN documentation. My recommendation is:  Face To Face Visit. Same Day: to be seen by another provider in same service line     I am not this patient's PCP but I recommend he has an office visit. I don't have any openings today  but can look at other providers thanks  Michelle Srivastava, APRN, CNP

## 2022-05-21 ENCOUNTER — E-VISIT (OUTPATIENT)
Dept: FAMILY MEDICINE | Facility: CLINIC | Age: 47
End: 2022-05-21
Payer: COMMERCIAL

## 2022-05-21 DIAGNOSIS — K04.7 DENTAL ABSCESS: Primary | ICD-10-CM

## 2022-05-21 PROCEDURE — 99421 OL DIG E/M SVC 5-10 MIN: CPT | Performed by: FAMILY MEDICINE

## 2022-05-23 RX ORDER — CEFUROXIME AXETIL 500 MG/1
500 TABLET ORAL 2 TIMES DAILY
Qty: 14 TABLET | Refills: 0 | Status: SHIPPED | OUTPATIENT
Start: 2022-05-23 | End: 2022-05-30

## 2022-05-23 NOTE — PATIENT INSTRUCTIONS
Thank you for choosing us for your care. I have placed an order for a prescription so that you can start treatment. View your full visit summary for details by clicking on the link below. Your pharmacist will able to address any questions you may have about the medication.     If you're not feeling better within 5-7 days, please schedule an appointment.  You can schedule an appointment right here in [facility], or call [phone]  If the visit is for the same symptoms as your eVisit, we'll refund the cost of your eVisit if seen within seven days.

## 2022-06-29 DIAGNOSIS — G47.33 OSA (OBSTRUCTIVE SLEEP APNEA): Primary | ICD-10-CM

## 2022-06-29 DIAGNOSIS — G47.33 OBSTRUCTIVE SLEEP APNEA (ADULT) (PEDIATRIC): ICD-10-CM

## 2022-08-12 ASSESSMENT — SLEEP AND FATIGUE QUESTIONNAIRES
HOW LIKELY ARE YOU TO NOD OFF OR FALL ASLEEP WHILE SITTING AND TALKING TO SOMEONE: WOULD NEVER DOZE
HOW LIKELY ARE YOU TO NOD OFF OR FALL ASLEEP WHILE SITTING INACTIVE IN A PUBLIC PLACE: WOULD NEVER DOZE
HOW LIKELY ARE YOU TO NOD OFF OR FALL ASLEEP IN A CAR, WHILE STOPPED FOR A FEW MINUTES IN TRAFFIC: WOULD NEVER DOZE
HOW LIKELY ARE YOU TO NOD OFF OR FALL ASLEEP WHEN YOU ARE A PASSENGER IN A CAR FOR AN HOUR WITHOUT A BREAK: WOULD NEVER DOZE
HOW LIKELY ARE YOU TO NOD OFF OR FALL ASLEEP WHILE SITTING AND READING: SLIGHT CHANCE OF DOZING
HOW LIKELY ARE YOU TO NOD OFF OR FALL ASLEEP WHILE WATCHING TV: WOULD NEVER DOZE
HOW LIKELY ARE YOU TO NOD OFF OR FALL ASLEEP WHILE LYING DOWN TO REST IN THE AFTERNOON WHEN CIRCUMSTANCES PERMIT: MODERATE CHANCE OF DOZING
HOW LIKELY ARE YOU TO NOD OFF OR FALL ASLEEP WHILE SITTING QUIETLY AFTER LUNCH WITHOUT ALCOHOL: SLIGHT CHANCE OF DOZING

## 2022-08-19 ENCOUNTER — VIRTUAL VISIT (OUTPATIENT)
Dept: SLEEP MEDICINE | Facility: CLINIC | Age: 47
End: 2022-08-19
Payer: COMMERCIAL

## 2022-08-19 VITALS — HEIGHT: 78 IN | BODY MASS INDEX: 36.45 KG/M2 | WEIGHT: 315 LBS

## 2022-08-19 DIAGNOSIS — G47.33 OSA (OBSTRUCTIVE SLEEP APNEA): Primary | ICD-10-CM

## 2022-08-19 PROCEDURE — 99213 OFFICE O/P EST LOW 20 MIN: CPT | Mod: 95 | Performed by: PHYSICIAN ASSISTANT

## 2022-08-19 ASSESSMENT — PAIN SCALES - GENERAL: PAINLEVEL: NO PAIN (0)

## 2022-08-19 NOTE — PROGRESS NOTES
Juan Alberto is a 47 year old who is being evaluated via a billable video visit.      How would you like to obtain your AVS? MyChart  If the video visit is dropped, the invitation should be resent by: Text to cell phone: [phone]  Will anyone else be joining your video visit? Corrie VERAS        Video-Visit Details    Video Start Time: 10:05 AM    Type of service:  Video Visit    Video End Time:10:21 AM    Originating Location (pt. Location): Other car    Distant Location (provider location):  [facility]     Platform used for Video Visit: StayTuned

## 2022-08-19 NOTE — PATIENT INSTRUCTIONS
Your sleep apnea treatment may be affected by device recall    Our records show that you may have a Roland Respironics CPAP for the treatment of sleep apnea. Many of these devices have been recalled* by the  for replacement. [facility] recommends:     1) If you are using a Resmed device, continue using the device.  2) If you have a Roland Respironics device, register your device for confirmation of type of device and repair of the device at https://www.[employer]/healthcare/e/sleep/communications/src-update -if you cannot use link, call [phone].  The website will assist you in obtaining the serial number for registration.   3) If you are using a Roland Respironics CPAP or Bilevel PAP device and you do not have immediate breathing, driving or cardiovascular risks without the device, consider stopping use of the device after verification that is has been recalled. Discuss this decision with your medical provider if you are uncertain about your medical risks.  4) If you are not using Respironics CPAP but are using a Respironics advanced device for breathing support (AVAPS, ASV, Bilevel PAP), continue using the device and review 5 and 6 below).     5) If you continue the device, do not include ozone generating  connected to PAP devices.  6) Bacterial filters to reduce exposure to particulates are sometimes cumbersome to use and are not currently recommended by the .    ?       You may also choose discuss with your provider alternative approaches to treatment.      *[employer] is voluntarily recalling the below devices due to two (2) issues related to the polyester-based polyurethane (PE-PUR) sound abatement foam used in Roland Continuous and Non-Continuous Ventilators: 1) PE-PUR foam may degrade into particles which may enter the device's the air pathway and be ingested or inhaled by the user, and 2) the PE-PUR foam may off-gas certain chemicals.  The foam degradation may be exacerbated by use of unapproved cleaning methods, such as ozone (see FDA safety communication on use of Ozone ), and off-gassing may occur during initial operation and may possibly continue throughout the device's useful life.   These issues can result in serious injury which can be life-threatening, cause permanent impairment, and/or require medical intervention to preclude permanent impairment. To date, [facility] has received several complaints regarding the presence of black debris/particles within the airpath circuit (extending from the device outlet, humidifier, tubing, and mask). Roland also has received reports of headache, upper airway irritation, cough, chest pressure and sinus infection. The potential risks of particulate exposure include: Irritation (skin, eye, and respiratory tract), inflammatory response, headache, asthma, adverse effects to other organs (e.g. kidneys and liver) and toxic carcinogenic affects. The potential risks of chemical exposure due to off-gassing include: headache/dizziness, irritation (eyes, nose, respiratory tract, skin), hypersensitivity, nausea/vomiting, toxic and carcinogenic effects. There have been no reports of death as a result of these issues.    Actions to be taken:  Discontinue the use of your device.  Do not continue to use ozone  with the device.     [place] affected devices on the recall website, www.[employer].com/SRC-update    i. The website provides current information on the status of the recall and how  to receive permanent corrective action to address the two issues.    ii. The website also provides instructions on how to locate an affected device  Serial Number and will guide users through the registration process.    iii. In the , call [phone] Service Hotline if you cannot visit the website

## 2022-08-19 NOTE — PROGRESS NOTES
CPAP Follow-Up Visit:    Date on this visit: 8/19/2022    Juan Alberto Menchaca has a follow-up of his CPAP use for severe HARIS. He was initially seen at the [facility] for snoring and observed periods where it looks like he is trying to catch his breath during sleep within the last year. His medical history is non-contributory.     HST results from 3/18/2019: Wt: 319#  AHI 46.6/hr. AHI was 57.4 per hour supine, - per hour prone, 34.9 per hour on left side, and 59.8 per hour on right side. Baseline oxygen saturation was 90.8%.  Time with saturation less than or equal to 88% was 154 minutes. The lowest oxygen saturation was 72%.   Position: Percent of time spent: supine - 25%, prone - 0%, on left - 50.7%, on right - 24%.    His machine is recalled but he couldn't figure out how to register his machine. He was using SoClean nightly but stopped about 6 months ago when he heard about the recall. He has not noticed any symptoms associated with the recall.     He feels things are going fine. He has started using the water chamber again recently because he was getting a dry nose.   His pressure seems to be lower than it was at his last visit. The 90th% pressure was 15.5 cm then and is 11 cm now. He thinks he is sleeping more on his sides now.     PAP machine: Respironics. Pressure settings: 7-17 cm      The compliance data shows that the patient used the CPAP for 100% of nights for >4 hours.  The 90th% pressure is 11.1 cm.  The average time in large leak is 0.  The average nightly usage is 445 min.  The average AHI is 0.4/hr.         Interface:  Mask: N20 mask. He changes the cushion about every 6-8 weeks, but the headgear not often. Filters monthly. He cleans supplies 1-2 times per week.   Chin strap: No  Leak: No  Using Humidifier: Yes  Condensation in hose or mask: occ some in the mask, not for a while though     Difficulties with therapy:    [-] Snoring with CPAP:  [-] Difficulty tolerating the pressure:  [-]  Epistaxis/dry nose:   [-] Nasal congestion:  [-] Dry mouth:  [-] Mouth breathing:   [-] Pain/skin breakdown:      Improvements noted with CPAP: struggles to sleep without it. Even brings a generator while camping.  [+] waking up more refreshed  [+] sleeping better with less arousals  [+] nocturia improved   [+] improved energy level during the day    Weight change since sleep study: 330 lbs    Bedtime: 9:30-10 PM.  Wake time: 5:45 AM. Falls asleep in 5 minutes. Wakes 0 times per night  Naps: none.       Past medical/surgical history, family history, social history, medications and allergies were reviewed.      Problem List:  Patient Active Problem List    Diagnosis Date Noted     Morbid obesity (H) 05/22/2019     Priority: Medium     HARIS (obstructive sleep apnea) 03/20/2019     Priority: Medium     Severe HARIS          Impression/Plan:    (G47.33) HARIS (obstructive sleep apnea)  (primary encounter diagnosis)  Comment: Juan Alberto is doing well with CPAP, using it regularly and benefiting from use.  His machine is under recall, he has not registered it for replacement yet. He has not noted symptoms related to the recall. He stopped using SoClean about 6 months ago.  Plan: Comprehensive DME        Continue auto CPAP 7-17 cm. A prescription was written for new supplies. We reviewed recommendations for cleaning and replacing supplies. He was given information about the recall and how to register his machine for replacement.       He will follow up with me in about 2 year(s).     21 minutes were spent on the date of the encounter doing chart review, history and exam, documentation and further activities as noted above.     Bennett Goltz, PA-C    CC: No ref. provider found

## 2022-09-11 ENCOUNTER — APPOINTMENT (OUTPATIENT)
Dept: RADIOLOGY | Facility: HOSPITAL | Age: 47
End: 2022-09-11
Attending: EMERGENCY MEDICINE
Payer: COMMERCIAL

## 2022-09-11 ENCOUNTER — HOSPITAL ENCOUNTER (EMERGENCY)
Facility: HOSPITAL | Age: 47
Discharge: HOME | End: 2022-09-11
Attending: EMERGENCY MEDICINE | Admitting: EMERGENCY MEDICINE
Payer: COMMERCIAL

## 2022-09-11 VITALS
HEART RATE: 78 BPM | DIASTOLIC BLOOD PRESSURE: 87 MMHG | TEMPERATURE: 99 F | RESPIRATION RATE: 16 BRPM | OXYGEN SATURATION: 95 % | WEIGHT: 315 LBS | HEIGHT: 76 IN | SYSTOLIC BLOOD PRESSURE: 140 MMHG | BODY MASS INDEX: 38.36 KG/M2

## 2022-09-11 DIAGNOSIS — S60.222A CONTUSION OF LEFT HAND, INITIAL ENCOUNTER: ICD-10-CM

## 2022-09-11 DIAGNOSIS — S80.211A ABRASION OF RIGHT KNEE, INITIAL ENCOUNTER: ICD-10-CM
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
## 2022-09-11 DIAGNOSIS — S46.911A STRAIN OF RIGHT SHOULDER, INITIAL ENCOUNTER: ICD-10-CM

## 2022-09-11 DIAGNOSIS — S60.512A ABRASION OF LEFT HAND, INITIAL ENCOUNTER: ICD-10-CM

## 2022-09-11 PROCEDURE — 250N000009 HC RX 250: Performed by: EMERGENCY MEDICINE

## 2022-09-11 PROCEDURE — 271N000002 HC RX 271: Performed by: EMERGENCY MEDICINE

## 2022-09-11 PROCEDURE — 73030 X-RAY EXAM OF SHOULDER: CPT | Mod: RT

## 2022-09-11 PROCEDURE — 99285 EMERGENCY DEPT VISIT HI MDM: CPT | Mod: 25

## 2022-09-11 PROCEDURE — 71046 X-RAY EXAM CHEST 2 VIEWS: CPT

## 2022-09-11 PROCEDURE — 73130 X-RAY EXAM OF HAND: CPT | Mod: LT

## 2022-09-11 PROCEDURE — 73562 X-RAY EXAM OF KNEE 3: CPT | Mod: RT

## 2022-09-11 RX ORDER — METHYLCELLULOSE 4000CPS 30 %
POWDER (GRAM) MISCELLANEOUS
Status: COMPLETED | OUTPATIENT
Start: 2022-09-11 | End: 2022-09-11

## 2022-09-11 RX ORDER — METHYLCELLULOSE 4000CPS 30 %
POWDER (GRAM) MISCELLANEOUS
Status: DISCONTINUED | OUTPATIENT
Start: 2022-09-11 | End: 2022-09-11 | Stop reason: HOSPADM

## 2022-09-11 RX ORDER — GINSENG 100 MG
CAPSULE ORAL ONCE
Status: COMPLETED | OUTPATIENT
Start: 2022-09-11 | End: 2022-09-11

## 2022-09-11 RX ADMIN — Medication 3 ML: at 20:30

## 2022-09-11 RX ADMIN — BACITRACIN: 500 OINTMENT TOPICAL at 22:50

## 2022-09-11 RX ADMIN — METHYLCELLULOSE: 2 POWDER, FOR SOLUTION ORAL at 20:31

## 2022-09-11 ASSESSMENT — ACTIVITIES OF DAILY LIVING (ADL): ADLS_ACUITY_SCORE: 35

## 2022-09-11 NOTE — ED TRIAGE NOTES
Triage Assessment     Row Name 09/11/22 1846       Triage Assessment (Adult)    Airway WDL WDL       Respiratory WDL    Respiratory WDL WDL       Skin Circulation/Temperature WDL    Skin Circulation/Temperature WDL WDL       Cardiac WDL    Cardiac WDL WDL       Peripheral/Neurovascular WDL    Peripheral Neurovascular WDL WDL       Cognitive/Neuro/Behavioral WDL    Cognitive/Neuro/Behavioral WDL WDL

## 2022-09-11 NOTE — Clinical Note
Juan Alberto Menhcaca was seen and treated in our emergency department on 9/11/2022.  He may return to work on 09/13/2022.       If you have any questions or concerns, please don't hesitate to call.      Jc Demarco, DO
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
## 2022-09-12 NOTE — DISCHARGE INSTRUCTIONS
Cleanse the area of abrasion daily with warm soapy water and apply bacitracin.  Take ibuprofen 600 mg every 8 hours and Tylenol 1 g every 8 hours for pain management.  Signs of infection redness swelling or drainage return to the emergency department.
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
## 2022-10-16 ENCOUNTER — HEALTH MAINTENANCE LETTER (OUTPATIENT)
Age: 47
End: 2022-10-16

## 2022-12-03 ENCOUNTER — HEALTH MAINTENANCE LETTER (OUTPATIENT)
Age: 47
End: 2022-12-03

## 2023-11-07 ENCOUNTER — MYC MEDICAL ADVICE (OUTPATIENT)
Dept: FAMILY MEDICINE | Facility: CLINIC | Age: 48
End: 2023-11-07
Payer: COMMERCIAL

## 2024-01-12 ASSESSMENT — ENCOUNTER SYMPTOMS
HEADACHES: 0
COUGH: 1
ARTHRALGIAS: 1
JOINT SWELLING: 1
CHILLS: 0
PALPITATIONS: 0
DIZZINESS: 0
NAUSEA: 0
MYALGIAS: 0
HEARTBURN: 1
ABDOMINAL PAIN: 0
NERVOUS/ANXIOUS: 0
HEMATURIA: 0
CONSTIPATION: 0
HEMATOCHEZIA: 0
WEAKNESS: 0
FEVER: 0
PARESTHESIAS: 0
SORE THROAT: 1
FREQUENCY: 0
DYSURIA: 0
EYE PAIN: 0
SHORTNESS OF BREATH: 0
DIARRHEA: 0

## 2024-01-13 ENCOUNTER — HEALTH MAINTENANCE LETTER (OUTPATIENT)
Age: 49
End: 2024-01-13

## 2024-01-17 ENCOUNTER — ANCILLARY PROCEDURE (OUTPATIENT)
Dept: GENERAL RADIOLOGY | Facility: CLINIC | Age: 49
End: 2024-01-17
Attending: FAMILY MEDICINE
Payer: COMMERCIAL

## 2024-01-17 ENCOUNTER — OFFICE VISIT (OUTPATIENT)
Dept: FAMILY MEDICINE | Facility: CLINIC | Age: 49
End: 2024-01-17
Payer: COMMERCIAL

## 2024-01-17 VITALS
HEIGHT: 76 IN | DIASTOLIC BLOOD PRESSURE: 84 MMHG | OXYGEN SATURATION: 97 % | RESPIRATION RATE: 20 BRPM | BODY MASS INDEX: 38.36 KG/M2 | HEART RATE: 94 BPM | TEMPERATURE: 99 F | WEIGHT: 315 LBS | SYSTOLIC BLOOD PRESSURE: 132 MMHG

## 2024-01-17 DIAGNOSIS — J40 BRONCHITIS: Primary | ICD-10-CM

## 2024-01-17 DIAGNOSIS — R05.9 COUGH, UNSPECIFIED TYPE: ICD-10-CM

## 2024-01-17 DIAGNOSIS — J40 BRONCHITIS: ICD-10-CM

## 2024-01-17 PROCEDURE — 99213 OFFICE O/P EST LOW 20 MIN: CPT | Performed by: FAMILY MEDICINE

## 2024-01-17 PROCEDURE — 71046 X-RAY EXAM CHEST 2 VIEWS: CPT | Mod: TC | Performed by: RADIOLOGY

## 2024-01-17 RX ORDER — PREDNISONE 20 MG/1
40 TABLET ORAL DAILY
Qty: 10 TABLET | Refills: 0 | Status: SHIPPED | OUTPATIENT
Start: 2024-01-17 | End: 2024-01-22

## 2024-01-17 RX ORDER — ALBUTEROL SULFATE 90 UG/1
2 AEROSOL, METERED RESPIRATORY (INHALATION) EVERY 6 HOURS PRN
Qty: 18 G | Refills: 0 | Status: SHIPPED | OUTPATIENT
Start: 2024-01-17

## 2024-01-17 RX ORDER — AZITHROMYCIN 250 MG/1
TABLET, FILM COATED ORAL
Qty: 6 TABLET | Refills: 0 | Status: SHIPPED | OUTPATIENT
Start: 2024-01-17 | End: 2024-01-22

## 2024-01-17 ASSESSMENT — ENCOUNTER SYMPTOMS: COUGH: 1

## 2024-01-17 NOTE — PROGRESS NOTES
"  Assessment & Plan     (J40) Bronchitis  (primary encounter diagnosis)  Comment: pt has cough for 2 month. Cough with white sputum. Cough usually happens when he is at outside. 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 Cough is better when he is inside. No cough at bed time, he wears c pap machine for jacob. Denies wheezing, sob, changing environment, sick contact..  no history of asthma and non smoker. He tried otc cough medication and not much help. Exam is not remarkable. Chest x ray negative. Likely asthma? Vs  bronchitis?  Plan: XR Chest 2 Views, azithromycin (ZITHROMAX) 250         MG tablet, predniSONE (DELTASONE) 20 MG tablet,        albuterol (PROAIR HFA/PROVENTIL HFA/VENTOLIN         HFA) 108 (90 Base) MCG/ACT inhaler        Advise rest and avoid cold air to wear mask when he is outside. Will take z pack and oral prednisone. Will try albuterol inh prn if symptoms worse or persists will consider referral to allergist.     (R05.9) Cough, unspecified type  Comment: cough when he is outside inspiration cold air.   Plan: advise to wear mask when working outsider to avoid inspirate cold air.          BMI  Estimated body mass index is 41.39 kg/m  as calculated from the following:    Height as of this encounter: 1.93 m (6' 4\").    Weight as of this encounter: 154.2 kg (340 lb).   Weight management plan: Discussed healthy diet and exercise guidelines    See Patient Instructions. Pt is due for pe. He has appointment already.     Constantine Avendano is a 49 year old, presenting for the following health issues:  Cough (X 2 months )        1/17/2024     4:08 PM   Additional Questions   Roomed by Gen AGUILA CMA     Cough    History of Present Illness       Reason for visit:  O have 2 appoinyments i believe this one is for this chroniv flemmy cough pete had for a while or this is for ye a rly physical  Symptom onset:  More than a month  Symptoms include:  Flemmy cough  Symptom intensity:  Moderate  Symptom progression:  Staying the " "same  Had these symptoms before:  Yes  Has tried/received treatment for these symptoms:  No  What makes it worse:  No  What makes it better:  No    He eats 0-1 servings of fruits and vegetables daily.He consumes 1 sweetened beverage(s) daily.He exercises with enough effort to increase his heart rate 60 or more minutes per day.  He exercises with enough effort to increase his heart rate 5 days per week.   He is taking medications regularly.          Objective    /84   Pulse 94   Temp 99  F (37.2  C) (Oral)   Resp 20   Ht 1.93 m (6' 4\")   Wt (!) 154.2 kg (340 lb)   SpO2 97%   BMI 41.39 kg/m    Body mass index is 41.39 kg/m .    Review of Systems  Constitutional, HEENT, cardiovascular, pulmonary, gi and gu systems are negative, except as otherwise noted.  Physical Exam   GENERAL: alert and no distress  NECK: no adenopathy, no asymmetry, masses, or scars  RESP: lungs clear to auscultation - no rales, rhonchi or wheezes  CV: regular rate and rhythm, normal S1 S2, no S3 or S4, no murmur, click or rub, no peripheral edema  ABDOMEN: soft, nontender, no hepatosplenomegaly, no masses and bowel sounds normal  MS: no gross musculoskeletal defects noted, no edema    CXR - Reviewed and interpreted by me Normal- no infiltrates, effusions, pneumothoraces, cardiomegaly or masses        Signed Electronically by: Rasheeda Mcdonald MD    "

## 2024-01-17 NOTE — LETTER
January 17, 2024      Juan Alberto Menchaca  [address] [place] [address]        To Whom It May Concern:    Juan Alberto Menchaca  was seen on 1/17/2024.  Please excuse him from work 1/18/2024 due to illness.        Sincerely,        Rasheeda Mcdonald MD on 1/17/2024 at 4:30 PM'

## 2024-01-19 ENCOUNTER — OFFICE VISIT (OUTPATIENT)
Dept: FAMILY MEDICINE | Facility: CLINIC | Age: 49
End: 2024-01-19
Payer: COMMERCIAL

## 2024-01-19 VITALS
BODY MASS INDEX: 38.36 KG/M2 | HEIGHT: 76 IN | WEIGHT: 315 LBS | RESPIRATION RATE: 16 BRPM | OXYGEN SATURATION: 96 % | TEMPERATURE: 98.2 F | SYSTOLIC BLOOD PRESSURE: 130 MMHG | DIASTOLIC BLOOD PRESSURE: 80 MMHG | HEART RATE: 96 BPM

## 2024-01-19 DIAGNOSIS — Z11.59 NEED FOR HEPATITIS C SCREENING TEST: ICD-10-CM

## 2024-01-19 DIAGNOSIS — Z12.11 SCREEN FOR COLON CANCER: ICD-10-CM

## 2024-01-19 DIAGNOSIS — Z00.00 ROUTINE GENERAL MEDICAL EXAMINATION AT A HEALTH CARE FACILITY: Primary | ICD-10-CM

## 2024-01-19 DIAGNOSIS — Z13.1 SCREENING FOR DIABETES MELLITUS: ICD-10-CM

## 2024-01-19 DIAGNOSIS — Z13.220 LIPID SCREENING: ICD-10-CM

## 2024-01-19 LAB
HBA1C MFR BLD: 5.4 % (ref 0–5.6)
HCV AB SERPL QL IA: NONREACTIVE

## 2024-01-19 PROCEDURE — 86803 HEPATITIS C AB TEST: CPT | Performed by: NURSE PRACTITIONER

## 2024-01-19 PROCEDURE — 36415 COLL VENOUS BLD VENIPUNCTURE: CPT | Performed by: NURSE PRACTITIONER

## 2024-01-19 PROCEDURE — 99396 PREV VISIT EST AGE 40-64: CPT | Performed by: NURSE PRACTITIONER

## 2024-01-19 PROCEDURE — 80061 LIPID PANEL: CPT | Performed by: NURSE PRACTITIONER

## 2024-01-19 PROCEDURE — 80053 COMPREHEN METABOLIC PANEL: CPT | Performed by: NURSE PRACTITIONER

## 2024-01-19 PROCEDURE — 83036 HEMOGLOBIN GLYCOSYLATED A1C: CPT | Performed by: NURSE PRACTITIONER

## 2024-01-19 ASSESSMENT — ENCOUNTER SYMPTOMS
DIARRHEA: 0
NERVOUS/ANXIOUS: 0
CHILLS: 0
FREQUENCY: 0
WEAKNESS: 0
FEVER: 0
SHORTNESS OF BREATH: 0
SORE THROAT: 1
NAUSEA: 0
ARTHRALGIAS: 1
HEADACHES: 0
DIZZINESS: 0
CONSTIPATION: 0
ABDOMINAL PAIN: 0
COUGH: 1
MYALGIAS: 0
PALPITATIONS: 0
DYSURIA: 0
HEMATURIA: 0
EYE PAIN: 0
JOINT SWELLING: 1

## 2024-01-19 ASSESSMENT — PAIN SCALES - GENERAL: PAINLEVEL: NO PAIN (0)

## 2024-01-19 NOTE — PROGRESS NOTES
Preventive Care Visit  [facility]  Ezra Shelton NP,    Jan 19, 2024      SUBJECTIVE:   Juan Alberto is a 49 year old, presenting for the following:  Physical (Non fasting)        1/19/2024     3:38 PM   Additional Questions   Roomed by Tiffany Villarreal CMA     Works as a  and part time .  As a  does about 60% drive 40% walking    Right leg lump  Present for 20 years along the shin.  Unchanged, but sometimes a few times a day can be a little uncomfortable.  Started after a burn.      Healthy Habits:     Getting at least 3 servings of Calcium per day:  NO    Bi-annual eye exam:  NO    Dental care twice a year:  Yes    Sleep apnea or symptoms of sleep apnea:  Sleep apnea    Diet:  Regular (no restrictions)    Frequency of exercise:  None    Taking medications regularly:  Yes    Medication side effects:  None    Additional concerns today:  No      Today's PHQ-2 Score:       1/18/2024     9:59 AM   PHQ-2 ( 1999 [employer])   Q1: Little interest or pleasure in doing things 0   Q2: Feeling down, depressed or hopeless 0   PHQ-2 Score 0   Q1: Little interest or pleasure in doing things Not at all   Q2: Feeling down, depressed or hopeless Not at all   PHQ-2 Score 0     Have you ever done Advance Care Planning? (For example, a Health Directive, POLST, or a discussion with a medical provider or your loved ones about your wishes): No, advance care planning information given to patient to review.  Advanced care planning was discussed at today's visit.    Social History     Tobacco Use    Smoking status: Former     Packs/day: 1.00     Years: 17.00     Additional pack years: 0.00     Total pack years: 17.00     Types: Cigarettes    Smokeless tobacco: Never    Tobacco comments:     quit in about 2007, 1-1.5 ppd   Substance Use Topics    Alcohol use: Yes     Comment: 2 per night after work on most nights         1/12/2024     8:56 AM   Alcohol Use   Prescreen: >3 drinks/day or  ">7 drinks/week? Yes   AUDIT SCORE  5         1/12/2024     8:56 AM   AUDIT - Alcohol Use Disorders Identification Test - Reproduced from the World Health Organization Audit 2001 (Second Edition)   1.  How often do you have a drink containing alcohol? 2 to 3 times a week   2.  How many drinks containing alcohol do you have on a typical day when you are drinking? 3 or 4   3.  How often do you have five or more drinks on one occasion? Less than monthly   4.  How often during the last year have you found that you were not able to stop drinking once you had started? Never   5.  How often during the last year have you failed to do what was normally expected of you because of drinking? Never   6.  How often during the last year have you needed a first drink in the morning to get yourself going after a heavy drinking session? Never   7.  How often during the last year have you had a feeling of guilt or remorse after drinking? Never   8.  How often during the last year have you been unable to remember what happened the night before because of your drinking? Never   9.  Have you or someone else been injured because of your drinking? No   10. Has a relative, friend, doctor or other health care worker been concerned about your drinking or suggested you cut down? No   TOTAL SCORE 5     Last PSA: No results found for: \"PSA\"    Reviewed orders with patient. Reviewed health maintenance and updated orders accordingly - Yes  Lab work is in process    Reviewed and updated as needed this visit by clinical staff   Tobacco  Allergies  Meds            Reviewed and updated as needed this visit by Provider                  Review of Systems   Constitutional:  Negative for chills and fever.   HENT:  Positive for congestion and sore throat. Negative for ear pain and hearing loss.    Eyes:  Negative for pain and visual disturbance.   Respiratory:  Positive for cough. Negative for shortness of breath.    Cardiovascular:  Positive for chest " "pain. Negative for palpitations.   Gastrointestinal:  Negative for abdominal pain, constipation, diarrhea and nausea.   Genitourinary:  Negative for dysuria, frequency, genital sores, hematuria, penile discharge and urgency.   Musculoskeletal:  Positive for arthralgias and joint swelling. Negative for myalgias.   Skin:  Negative for rash.   Neurological:  Negative for dizziness, weakness and headaches.   Psychiatric/Behavioral:  The patient is not nervous/anxious.      OBJECTIVE:   /80 (BP Location: Left arm, Patient Position: Sitting, Cuff Size: Adult Large)   Pulse 96   Temp 98.2  F (36.8  C) (Oral)   Resp 16   Ht 1.93 m (6' 4\")   Wt (!) 156.1 kg (344 lb 1.6 oz)   SpO2 96%   BMI 41.89 kg/m     Estimated body mass index is 41.89 kg/m  as calculated from the following:    Height as of this encounter: 1.93 m (6' 4\").    Weight as of this encounter: 156.1 kg (344 lb 1.6 oz).  Physical Exam  GENERAL: alert and no distress  EYES: Eyes grossly normal to inspection, PERRL and conjunctivae and sclerae normal  HENT: ear canals and TM's normal, nose and mouth without ulcers or lesions  NECK: no adenopathy, no asymmetry, masses, or scars  RESP: lungs clear to auscultation - no rales, rhonchi or wheezes  CV: regular rate and rhythm, normal S1 S2, no S3 or S4, no murmur, click or rub, no peripheral edema  ABDOMEN: soft, nontender, no hepatosplenomegaly, no masses and bowel sounds normal  MS: no gross musculoskeletal defects noted, no edema  SKIN: no suspicious lesions or rashes  NEURO: Normal strength and tone, mentation intact and speech normal  PSYCH: mentation appears normal, affect normal/bright  LYMPH: no cervical, supraclavicular, axillary, or inguinal adenopathy    Diagnostic Test Results:  Labs reviewed in Epic    ASSESSMENT/PLAN:       ICD-10-CM    1. Routine general medical examination at a health care facility  Z00.00       2. Screen for colon cancer  Z12.11 Colonoscopy Screening  Referral    "   3. Need for hepatitis C screening test  Z11.59 Hepatitis C Screen Reflex to HCV RNA Quant and Genotype      4. Lipid screening  Z13.220 Lipid panel      5. Screening for diabetes mellitus  Z13.1 Comprehensive metabolic panel (BMP + Alb, Alk Phos, ALT, AST, Total. Bili, TP)     Hemoglobin A1c         Doing well. Get eye exam done. Plans on colonoscopy.  Update screening labs.  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  Has not changed in the last 20 years.  Discussed the opportunity of meeting with general surgery versus dermatology for removal/further assessment but he declines for now.  He will let me know if something changes    Patient has been advised of split billing requirements and indicates understanding: Yes      Counseling  Reviewed preventive health counseling, as reflected in patient instructions        He reports that he has quit smoking. His smoking use included cigarettes. He has a 17 pack-year smoking history. He has never used smokeless tobacco.        Signed Electronically by: Ezra Shelton NP  Answers submitted by the patient for this visit:  Annual Preventive Visit (Submitted on 1/12/2024)  Chief Complaint: Annual Exam:  Blood in stool: No  heartburn: Yes  mood changes: No  Skin sensation changes: No  impotence: Yes

## 2024-01-19 NOTE — PATIENT INSTRUCTIONS
Updating routine screening labs.    I highlighted the number for the colonoscopy people ([facility]).  We'll send your order over and if you don't hear from them in a week or 2, give them a call.     Preventive Health Recommendations  Male Ages 40 to 49    Yearly exam:             See your health care provider every year in order to  o   Review health changes.   o   Discuss preventive care.    o   Review your medicines if your doctor has prescribed any.  You should be tested each year for STDs (sexually transmitted diseases) if you re at risk.   Have a cholesterol test every 5 years.   Have a colonoscopy (test for colon cancer) beginning at age 45.  Ask your provider about other options like a yearly FIT test or Cologuard test every 3 years (stool tests)   After age 45, have a diabetes test (fasting glucose). If you are at risk for diabetes, you should have this test every 3 years.    Talk with your health care provider about whether or not a prostate cancer screening test (PSA) is right for you.    Shots: Get a flu shot each year. Get a tetanus shot every 10 years.     Nutrition:  Eat at least 5 servings of fruits and vegetables daily.   Eat whole-grain bread, whole-wheat pasta and brown rice instead of white grains and rice.   Get adequate Calcium and Vitamin D.     Lifestyle  Exercise for at least 150 minutes a week (30 minutes a day, 5 days a week). This will help you control your weight and prevent disease.   Limit alcohol to one drink per day.   No smoking.   Wear sunscreen to prevent skin cancer.   See your dentist every six months for an exam and cleaning.

## 2024-01-20 LAB
ALBUMIN SERPL BCG-MCNC: 4.4 G/DL (ref 3.5–5.2)
ALP SERPL-CCNC: 80 U/L (ref 40–150)
ALT SERPL W P-5'-P-CCNC: 46 U/L (ref 0–70)
ANION GAP SERPL CALCULATED.3IONS-SCNC: 6 MMOL/L (ref 7–15)
AST SERPL W P-5'-P-CCNC: 27 U/L (ref 0–45)
BILIRUB SERPL-MCNC: 0.2 MG/DL
BUN SERPL-MCNC: 20.8 MG/DL (ref 6–20)
CALCIUM SERPL-MCNC: 9.4 MG/DL (ref 8.6–10)
CHLORIDE SERPL-SCNC: 109 MMOL/L (ref 98–107)
CHOLEST SERPL-MCNC: 293 MG/DL
CREAT SERPL-MCNC: 0.87 MG/DL (ref 0.67–1.17)
DEPRECATED HCO3 PLAS-SCNC: 24 MMOL/L (ref 22–29)
EGFRCR SERPLBLD CKD-EPI 2021: >90 ML/MIN/1.73M2
FASTING STATUS PATIENT QL REPORTED: ABNORMAL
GLUCOSE SERPL-MCNC: 101 MG/DL (ref 70–99)
HDLC SERPL-MCNC: 57 MG/DL
LDLC SERPL CALC-MCNC: 202 MG/DL
NONHDLC SERPL-MCNC: 236 MG/DL
POTASSIUM SERPL-SCNC: 4 MMOL/L (ref 3.4–5.3)
PROT SERPL-MCNC: 7.1 G/DL (ref 6.4–8.3)
SODIUM SERPL-SCNC: 139 MMOL/L (ref 135–145)
TRIGL SERPL-MCNC: 168 MG/DL

## 2024-01-21 PROBLEM — E78.2 MIXED HYPERLIPIDEMIA: Status: ACTIVE | Noted: 2024-01-21

## 2024-07-16 ENCOUNTER — TELEPHONE (OUTPATIENT)
Dept: SLEEP MEDICINE | Facility: CLINIC | Age: 49
End: 2024-07-16
Payer: COMMERCIAL

## 2024-07-16 NOTE — TELEPHONE ENCOUNTER
General Call      Reason for Call:  patient called and scheduled an appt in Sept at with [facility]    Would like to get new cpap supplies prior to this appt.    Please contact patient.  Thank you.    What are your questions or concerns:  no    Date of last appointment with provider: 2022    Could we send this information to you in [email or web address] or would you prefer to receive a phone call?:   Patient would prefer a phone call   Okay to leave a detailed message?: Yes at Cell number on file:    Telephone Information:   Mobile [phone]

## 2024-07-16 NOTE — TELEPHONE ENCOUNTER
Last ov 8/19/22  Next ov 9/18/24    Patient requesting updated order for CPAP supplies prior to appointment. Order pended and routed to provider for consideration.    Patient uses [facility]    Cassandra ROSADO RN  [facility]

## 2024-09-18 ENCOUNTER — VIRTUAL VISIT (OUTPATIENT)
Dept: SLEEP MEDICINE | Facility: CLINIC | Age: 49
End: 2024-09-18
Payer: COMMERCIAL

## 2024-09-18 VITALS — WEIGHT: 315 LBS | HEIGHT: 78 IN | BODY MASS INDEX: 36.45 KG/M2

## 2024-09-18 DIAGNOSIS — G47.33 OSA (OBSTRUCTIVE SLEEP APNEA): ICD-10-CM

## 2024-09-18 DIAGNOSIS — E66.01 MORBID OBESITY (H): Primary | ICD-10-CM

## 2024-09-18 DIAGNOSIS — G47.33 OSA ON CPAP: ICD-10-CM

## 2024-09-18 PROCEDURE — G2211 COMPLEX E/M VISIT ADD ON: HCPCS | Mod: 95 | Performed by: INTERNAL MEDICINE

## 2024-09-18 PROCEDURE — 99213 OFFICE O/P EST LOW 20 MIN: CPT | Mod: 95 | Performed by: INTERNAL MEDICINE

## 2024-09-18 ASSESSMENT — PAIN SCALES - GENERAL: PAINLEVEL: NO PAIN (0)

## 2024-09-18 NOTE — PROGRESS NOTES
Virtual Visit Details    Type of service:  Video Visit   Originating Location (pt. Location): Home    Distant Location (provider location):  Off-site  Platform used for Video Visit: [facility]  Sleep clinic follow-up visit note    Date: September 18, 2024    Chief complaint/purpose of visit: Follow-up of HARIS, review CPAP compliance    History of Present Illness: Juan Alberto Menchaca is a 49-year-old male who presents for follow-up of their severe sleep apnea, managed with CPAP.   He was last seen at the sleep clinic by my colleague Bennett Goltz on August 19, 2022.    His CPAP device was recalled ([employer] RespiriFits) and he obtained replacement CPAP device through [facility] 1 year ago.  He reports using the CPAP regularly during sleep.    Do you use a CPAP Machine at home: Yes  Overall, on a scale of 0-10 how would you rate your CPAP (0 poor, 10 great): 8    What type of mask do you use: Nasal Mask  Is your mask comfortable: Yes  If not, why:      Is your mask leaking: No  If yes, where do you feel it:    How many night per week does the mask leak (0-7):     Do you notice snoring with mask on: No  Do you notice gasping arousals with mask on: No  Are you having significant oral or nasal dryness: Yes once in a while dry nose  Is the pressure setting comfortable: Yes  If not, why:      What is your typical bedtime: Between 9pm and 10 pm  How long does it take you to go to sleep on PAP therapy: Few minutes  What time do you typically get out of bed for the day: 545 am to 6 am  How many hours on average per night are you using PAP therapy: Everynight  How many hours are you sleeping per night: Between 7 and 8  Do you feel well rested in the morning: Yes    Previous sleep study report: HST   Study date: 3/18/2019: Wt: 319#  AHI 46.6/hr. AHI was 57.4 per hour supine, - per hour prone, 34.9 per hour on left side, and 59.8 per hour on right side. Baseline oxygen saturation was 90.8%.  Time with saturation  less than or equal to 88% was 154 minutes. The lowest oxygen saturation was 72%.   Position: Percent of time spent: supine - 25%, prone - 0%, on left - 50.7%, on right - 24%.    CPAP compliance download:([employer])  Auto-PAP 10.0 - 17.0 cmH2O 30 day usage data:    96% of days with > 4 hours of use. 1/30 days with no use.   Average use 473 minutes per day.   Average leak 27.21 LPM.    CPAP 90% pressure 11cm.   AHI 0.4 events per hour.       EPWORTH SLEEPINESS SCALE         9/17/2024     3:08 PM    Scenic Sleepiness Scale ( JODI Gaviria  3292-3322<br>ESS - [place]/English - Final version - 21 Nov 07 - [facility] [place].)   Sitting and reading Moderate chance of dozing   Watching TV Slight chance of dozing   Sitting, inactive in a public place (e.g. a theatre or a meeting) Would never doze   As a passenger in a car for an hour without a break Would never doze   Lying down to rest in the afternoon when circumstances permit Slight chance of dozing   Sitting and talking to someone Would never doze   Sitting quietly after a lunch without alcohol Would never doze   In a car, while stopped for a few minutes in traffic Would never doze   Scenic Score (MC) 4   Scenic Score (Sleep) 4       INSOMNIA SEVERITY INDEX (ANGEL)          9/17/2024     3:04 PM   Insomnia Severity Index (ANGEL)   Difficulty falling asleep 0   Difficulty staying asleep 0   Problems waking up too early 1   How SATISFIED/DISSATISFIED are you with your CURRENT sleep pattern? 1   How NOTICEABLE to others do you think your sleep problem is in terms of impairing the quality of your life? 1   How WORRIED/DISTRESSED are you about your current sleep problem? 0   To what extent do you consider your sleep problem to INTERFERE with your daily functioning (e.g. daytime fatigue, mood, ability to function at work/daily chores, concentration, memory, mood, etc.) CURRENTLY? 1   ANGEL Total Score 4       Guidelines for Scoring/Interpretation:  Total score categories:  0-7 =  "No clinically significant insomnia   8-14 = Subthreshold insomnia   15-21 = Clinical insomnia (moderate severity)  22-28 = Clinical insomnia (severe)  Used via courtesy of www.myhealth.va.gov with permission from Tj Morrison PhD., [facility]      Past medical/surgical history, family history, social history, medications and allergies were reviewed.      Problem List:  Patient Active Problem List    Diagnosis Date Noted    Mixed hyperlipidemia 01/21/2024     Priority: Medium    Morbid obesity (H) 05/22/2019     Priority: Medium    HARIS (obstructive sleep apnea) 03/20/2019     Priority: Medium     Severe HARIS               Physical Examination:     Ht 1.981 m (6' 6\")   Wt (!) 158.3 kg (349 lb)   BMI 40.33 kg/m    General: Pleasant. Cooperative. In no apparent distress.  Pulmonary: Able to speak in full sentences easily. No cough or wheeze.   Neurologic: Alert, oriented x3.  Psychiatric: Mood euthymic. Affect congruent with full range and intensity.     ASSESSMENT/PLAN:  Obstructive sleep apnea: Pt reports adequate compliance with CPAP and reports positive benefits with CPAP treatment. Based on compliance measures, HARIS is adequately controlled with CPAP at the current settings.    DME orders were generated for renewal of CPAP supplies.  Recommended to continue using the CPAP regularly during sleep and getting the supplies for the CPAP replaced regularly.   Patient instructed to remember to bring PAP with him if hospitalized and if anticipating procedure that requires sedation/surgery to be sure to discuss with the provider/surgeon that he  has sleep apnea and uses PAP therapy.     Obesity: We discussed weight management with healthy diet, and exercise. We discussed the option of referral to Medical weight management center and he was willing to obtain the referral, which was provided.     Patient was strongly advised to avoid driving, operating any heavy machinery or other hazardous situations while drowsy " "or sleepy.  Patient was counseled on the importance of driving while alert, to pull over if drowsy, or nap before getting into the vehicle if sleepy.      Follow-up with sleep clinic via video visit in 1 year or sooner if any concerns.     The above note was dictated using voice recognition software. Although reviewed after completion, some word and grammatical error may remain . Please contact the author for any clarifications.      \" Total time spent was 27 minutes  for this appointment on this date of service which include time spent before, during and after the visit for chart review, patient care, counseling and coordination of care. Including documentation\"      Jeremy Reece MD  [facility]  [address] , [place] [address]   "

## 2024-09-18 NOTE — NURSING NOTE
Current patient location:  at work corner of [address]    Is the patient currently in the state of [place]? YES    Visit mode:VIDEO    If the visit is dropped, the patient can be reconnected by: VIDEO VISIT: Text to cell phone:   Telephone Information:   Mobile [phone]       Will anyone else be joining the visit? NO  (If patient encounters technical issues they should call [phone]    How would you like to obtain your AVS? MyChart    Are changes needed to the allergy or medication list? Pt stated no changes to allergies and Pt stated no med changes    Are refills needed on medications prescribed by this physician? NO    Rooming Documentation:  Questionnaire(s) completed      Reason for visit: RECHECK    Gina MEJIAF

## 2024-09-20 NOTE — NURSING NOTE
1 year appointment reminder message was created and will be sent out 2 - 3 months prior to next appointment due date. Via [employer]

## 2025-02-22 ENCOUNTER — HEALTH MAINTENANCE LETTER (OUTPATIENT)
Age: 50
End: 2025-02-22

## 2025-03-01 ENCOUNTER — ANCILLARY PROCEDURE (OUTPATIENT)
Dept: GENERAL RADIOLOGY | Facility: CLINIC | Age: 50
End: 2025-03-01
Attending: INTERNAL MEDICINE
Payer: COMMERCIAL

## 2025-03-01 ENCOUNTER — OFFICE VISIT (OUTPATIENT)
Dept: URGENT CARE | Facility: URGENT CARE | Age: 50
End: 2025-03-01
Payer: COMMERCIAL

## 2025-03-01 VITALS
OXYGEN SATURATION: 98 % | HEART RATE: 76 BPM | DIASTOLIC BLOOD PRESSURE: 89 MMHG | TEMPERATURE: 97.5 F | WEIGHT: 315 LBS | BODY MASS INDEX: 40.91 KG/M2 | SYSTOLIC BLOOD PRESSURE: 141 MMHG

## 2025-03-01 DIAGNOSIS — S93.491A SPRAIN OF ANTERIOR TALOFIBULAR LIGAMENT OF RIGHT ANKLE, INITIAL ENCOUNTER: Primary | ICD-10-CM

## 2025-03-01 DIAGNOSIS — S99.911A ANKLE INJURY, RIGHT, INITIAL ENCOUNTER: ICD-10-CM

## 2025-03-01 PROCEDURE — 99213 OFFICE O/P EST LOW 20 MIN: CPT | Performed by: INTERNAL MEDICINE

## 2025-03-01 PROCEDURE — 73610 X-RAY EXAM OF ANKLE: CPT | Mod: TC | Performed by: RADIOLOGY

## 2025-03-01 NOTE — PROGRESS NOTES
SUBJECTIVE:  Chief complaint of right ankle pain.  He was walking on sidewalk when he hit an uneven spot and felt a roll in the right ankle.  He was able to walk on it for the remainder of the day.  Noting now more pain and swelling and pain with inversion.     OBJECTIVE:  BP (!) 141/89 (BP Location: Right arm, Patient Position: Sitting, Cuff Size: Adult Large)   Pulse 76   Temp 97.5  F (36.4  C) (Tympanic)   Wt (!) 160.6 kg (354 lb)   SpO2 98%   BMI 40.91 kg/m    GEN: alert and interactive  RIGHT ANKLE: discrete swelling over the lateral malleolus without ecchymosis; focal tenderness at the lateral malleolus; non-tender at the medial malleolus and base of the fifth metatarsal    Plain films of the right ankle, which I have personally reviewed and interpreted, are negative for fracture.    ASSESSMENT/PLAN:    ICD-10-CM    1. Sprain of anterior talofibular ligament of right ankle, initial encounter  S93.491A Ankle/Foot Bracing Supplies Order Ankle Brace; Right      2. Ankle injury, right, initial encounter  S99.911A XR Ankle Right G/E 3 Views      Conservative management with NSAIDs, stretching exercises, rest and icing. Progress to strengthening exercises as tolerated.     Reyes Hernandez MD

## 2025-03-01 NOTE — LETTER
Juan Alberto Menchaca   1975        To Whom it May Concern;    I have seen Juan Alberto Menchaca in the Urgent Care on 3/1/2025 for a sprain of the right ankle.  Please provide the following accommodations for work during the week of 3/1/2025 - 3/8/2025:  Patient is able to walk his usual work route  No standing / weight-bearing > 30 minutes consecutive at one stretch (patient should be able to facilitate this within his normal work day - this denotes one consecutive 30 minute stretch, not 30 minutes for the entire day)  Use supportive ankle brace while working.    Thank you.    Sincerely,        Reyes Hernandez MD

## 2025-03-01 NOTE — LETTER
3/1/2025    Juan Alberto Menchaca   1975        To Whom it May Concern;    I have seen Juan Alberto Menchaca in the Urgent Care on 3/1/2025 for a sprain of the right ankle.  Please provide the following accommodations for work during the week of 3/1/2025 - 3/8/2025:  No walking > 2 hours cumulative in an 8 hour shift  No standing / weight-bearing > 30 minutes consecutive at one stretch  Use supportive ankle brace while working.    Thank you.    Sincerely,        Reyes Hernandez MD

## 2025-03-03 ENCOUNTER — TELEPHONE (OUTPATIENT)
Dept: SCHEDULING | Facility: CLINIC | Age: 50
End: 2025-03-03
Payer: COMMERCIAL

## 2025-03-03 NOTE — PROGRESS NOTES
Left message for pt that the letter was sent and he can access through [employer].   Oliva Leone, CMA

## 2025-03-03 NOTE — TELEPHONE ENCOUNTER
See office visit encounter. Letter updates pended and routed to clinician team for review.    GRACIELA AvendañoN, PHN, AMB-BC (she/her)  [facility] RN

## 2025-03-03 NOTE — TELEPHONE ENCOUNTER
Forms/Letter Request    Type of form/letter: OTHER: Work restrictions        Do we have the form/letter: Yes: Work restrictions     Who is the form from?     Where did/will the form come from?     When is form/letter needed by: Please call patient regarding this and he will give more detailed information on what his employer is requesting     How would you like the form/letter returned:     Patient Notified form requests are processed in 5-7 business days:    Could we send this information to you in [employer] or would you prefer to receive a phone call?:   Patient would prefer a phone call   Okay to leave a detailed message?: Yes at Cell number on file:    Telephone Information:   Mobile [phone]

## 2025-03-03 NOTE — PROGRESS NOTES
Dr. Hernandez or mary  clinician - patient needs work letter to be less specific, as his employer is incorrectly interpreting this to mean he can only work for 2 hrs per day or 30 minutes at a time. Writer made edits and letter pended for review to be made available via [employer].    Patient requests these letter changes today, as he was sent home after 2hrs and made to use PTO. He would like to return to work tomorrow with a clarified letter.    CHIDI Celaya, GRACIELAN, PHN, AMB-BC (she/her)  [facility] RN

## 2025-03-06 ENCOUNTER — OFFICE VISIT (OUTPATIENT)
Dept: FAMILY MEDICINE | Facility: CLINIC | Age: 50
End: 2025-03-06
Attending: NURSE PRACTITIONER
Payer: COMMERCIAL

## 2025-03-06 VITALS
HEART RATE: 91 BPM | BODY MASS INDEX: 37.19 KG/M2 | OXYGEN SATURATION: 97 % | DIASTOLIC BLOOD PRESSURE: 82 MMHG | WEIGHT: 315 LBS | HEIGHT: 77 IN | TEMPERATURE: 97.5 F | SYSTOLIC BLOOD PRESSURE: 128 MMHG | RESPIRATION RATE: 16 BRPM

## 2025-03-06 DIAGNOSIS — G47.33 OSA (OBSTRUCTIVE SLEEP APNEA): ICD-10-CM

## 2025-03-06 DIAGNOSIS — J06.9 VIRAL UPPER RESPIRATORY TRACT INFECTION: ICD-10-CM

## 2025-03-06 DIAGNOSIS — E78.2 MIXED HYPERLIPIDEMIA: ICD-10-CM

## 2025-03-06 DIAGNOSIS — Z01.818 PREOP GENERAL PHYSICAL EXAM: ICD-10-CM

## 2025-03-06 DIAGNOSIS — Z00.00 ROUTINE GENERAL MEDICAL EXAMINATION AT A HEALTH CARE FACILITY: Primary | ICD-10-CM

## 2025-03-06 DIAGNOSIS — Z13.1 SCREENING FOR DIABETES MELLITUS: ICD-10-CM

## 2025-03-06 LAB
EST. AVERAGE GLUCOSE BLD GHB EST-MCNC: 117 MG/DL
HBA1C MFR BLD: 5.7 % (ref 0–5.6)

## 2025-03-06 PROCEDURE — 99396 PREV VISIT EST AGE 40-64: CPT | Performed by: STUDENT IN AN ORGANIZED HEALTH CARE EDUCATION/TRAINING PROGRAM

## 2025-03-06 PROCEDURE — 99213 OFFICE O/P EST LOW 20 MIN: CPT | Mod: 25 | Performed by: STUDENT IN AN ORGANIZED HEALTH CARE EDUCATION/TRAINING PROGRAM

## 2025-03-06 PROCEDURE — 83036 HEMOGLOBIN GLYCOSYLATED A1C: CPT | Performed by: STUDENT IN AN ORGANIZED HEALTH CARE EDUCATION/TRAINING PROGRAM

## 2025-03-06 PROCEDURE — 36415 COLL VENOUS BLD VENIPUNCTURE: CPT | Performed by: STUDENT IN AN ORGANIZED HEALTH CARE EDUCATION/TRAINING PROGRAM

## 2025-03-06 PROCEDURE — 80061 LIPID PANEL: CPT | Performed by: STUDENT IN AN ORGANIZED HEALTH CARE EDUCATION/TRAINING PROGRAM

## 2025-03-06 PROCEDURE — G2211 COMPLEX E/M VISIT ADD ON: HCPCS | Performed by: STUDENT IN AN ORGANIZED HEALTH CARE EDUCATION/TRAINING PROGRAM

## 2025-03-06 SDOH — HEALTH STABILITY: PHYSICAL HEALTH: ON AVERAGE, HOW MANY DAYS PER WEEK DO YOU ENGAGE IN MODERATE TO STRENUOUS EXERCISE (LIKE A BRISK WALK)?: 6 DAYS

## 2025-03-06 SDOH — HEALTH STABILITY: PHYSICAL HEALTH: ON AVERAGE, HOW MANY MINUTES DO YOU ENGAGE IN EXERCISE AT THIS LEVEL?: PATIENT DECLINED

## 2025-03-06 ASSESSMENT — SOCIAL DETERMINANTS OF HEALTH (SDOH): HOW OFTEN DO YOU GET TOGETHER WITH FRIENDS OR RELATIVES?: ONCE A WEEK

## 2025-03-06 NOTE — PROGRESS NOTES
Preoperative Evaluation  [facility]  [address] [facility] [address]  Phone: [phone]  Fax: [phone]  Primary Provider: Physician No Ref-Primary  Pre-op Performing Provider: Jorge Conde MD  Mar 6, 2025             3/6/2025   Surgical Information   What procedure is being done? Colonoscopy   Facility or Hospital where procedure/surgery will be performed: [facility].   Who is doing the procedure / surgery? dr sami daily   Date of surgery / procedure: march 12 th 2025   Time of surgery / procedure: 10:00am   Where do you plan to recover after surgery? at home with family     Fax number for surgical facility: [phone]    Assessment & Plan     The proposed surgical procedure is considered LOW risk.    Preop general physical exam  Patient is medically cleared for his endoscopy.    Routine general medical examination at a [facility] care facility  Patient is overall in good health.   We reviewed personal, family and social history together and the chart was updated. Problem list and medication were reviewed and updated.      Mixed hyperlipidemia  - Lipid panel reflex to direct LDL Non-fasting  - Lipid panel reflex to direct LDL Non-fasting    Screening for diabetes mellitus  - Hemoglobin A1c  - Hemoglobin A1c    HARIS (obstructive sleep apnea)  Patient uses CPAP.    Viral upper respiratory tract infection  Patient is on day 2 of what is likely a viral URI, I suspect this will have cleared by the time he has to go for his endoscopy.  However, advised patient to reach out if it has not.       - No identified additional risk factors other than previously addressed    Preoperative Medication Instructions  Antiplatelet or Anticoagulation Medication Instructions   - We reviewed the medication list and the patient is not on an antiplatelet or anticoagulation medications.    Additional Medication Instructions  We reviewed the medication list and  there are no chronic medications that need to be adjusted for this procedure.    Recommendation  Approval given to proceed with proposed procedure, without further diagnostic evaluation.    Constantine Avendano is a 50 year old, presenting for the following:  Pre-Op Exam          1/19/2024     3:38 PM   Additional Questions   Roomed by Tiffany Villarreal CMA     HPI:     Patient has a cough since yesterday   Woke up overnight coughing   Did not use albuterol   Not feeling short of breath    Does not fever, chills or body aches   Nose is running today     No other concerns            3/6/2025   Pre-Op Questionnaire   Have you ever had a heart attack or stroke? No   Have you ever had surgery on your heart or blood vessels, such as a stent placement, a coronary artery bypass, or surgery on an artery in your head, neck, heart, or legs? No   Do you have chest pain with activity? No   Do you have a history of heart failure? No   Do you currently have a cold, bronchitis or symptoms of other infection? (!) YES    Do you have a cough, shortness of breath, or wheezing? (!) YES    Do you or anyone in your family have previous history of blood clots? No   Do you or does anyone in your family have a serious bleeding problem such as prolonged bleeding following surgeries or cuts? No   Have you ever had problems with anemia or been told to take iron pills? No   Have you had any abnormal blood loss such as black, tarry or bloody stools? (!) UNKNOWN    Have you ever had a blood transfusion? No   Are you willing to have a blood transfusion if it is medically needed before, during, or after your surgery? Yes   Have you or any of your relatives ever had problems with anesthesia? No   Do you have sleep apnea, excessive snoring or daytime drowsiness? (!) YES   Do you have a CPAP machine? Yes   Do you have any artifical heart valves or other implanted medical devices like a pacemaker, defibrillator, or continuous glucose monitor? No   Do you  "have artificial joints? No   Are you allergic to latex? No     Health Care Directive  Patient does not have a Health Care Directive: Discussed advance care planning with patient; information given to patient to review.    Preoperative Review of    reviewed - no record of controlled substances prescribed.          Patient Active Problem List    Diagnosis Date Noted    Mixed hyperlipidemia 01/21/2024     Priority: Medium    Morbid obesity (H) 05/22/2019     Priority: Medium    HARIS (obstructive sleep apnea) 03/20/2019     Priority: Medium     Severe HARIS        Past Medical History:   Diagnosis Date    Acid reflux     Open wound of finger 01/10/2003    Epic       Past Surgical History:   Procedure Laterality Date    scope on knee  Right 2013    SOFT TISSUE SURGERY  1999/2019    Scope on knee     Current Outpatient Medications   Medication Sig Dispense Refill    albuterol (PROAIR HFA/PROVENTIL HFA/VENTOLIN HFA) 108 (90 Base) MCG/ACT inhaler Inhale 2 puffs into the lungs every 6 hours as needed for shortness of breath, wheezing or cough 18 g 0       No Known Allergies     Social History     Tobacco Use    Smoking status: Former     Current packs/day: 0.00     Types: Cigarettes     Start date: 1/1/1985     Quit date: 1/1/2010     Years since quitting: 15.1    Smokeless tobacco: Never   Substance Use Topics    Alcohol use: Yes     Comment: 2 per night after work on most nights       History   Drug Use No               Objective    /82   Pulse 91   Temp 97.5  F (36.4  C) (Temporal)   Resp 16   Ht 1.943 m (6' 4.5\")   Wt (!) 157.4 kg (347 lb)   SpO2 97%   BMI 41.69 kg/m     Estimated body mass index is 41.69 kg/m  as calculated from the following:    Height as of this encounter: 1.943 m (6' 4.5\").    Weight as of this encounter: 157.4 kg (347 lb).  Physical Exam  GENERAL: alert and no distress  EYES: Eyes grossly normal to inspection, PERRL and conjunctivae and sclerae normal  HENT: ear canals and TM's " "normal, nose and mouth without ulcers or lesions  NECK: no adenopathy, no asymmetry, masses, or scars  RESP: lungs clear to auscultation - no rales, rhonchi or wheezes  CV: regular rate and rhythm, normal S1 S2, no S3 or S4, no murmur, click or rub, no peripheral edema  MS: no gross musculoskeletal defects noted, no edema  SKIN: no suspicious lesions or rashes  NEURO: Normal strength and tone, mentation intact and speech normal  PSYCH: mentation appears normal, affect normal/bright    No results for input(s): \"HGB\", \"PLT\", \"INR\", \"NA\", \"POTASSIUM\", \"CR\", \"A1C\" in the last 8760 hours.     Diagnostics  No labs were ordered during this visit.   No EKG required, no history of coronary heart disease, significant arrhythmia, peripheral arterial disease or other structural heart disease.    Revised Cardiac Risk Index (RCRI)  The patient has the following serious cardiovascular risks for perioperative complications:   - No serious cardiac risks = 0 points     RCRI Interpretation: 0 points: Class I (very low risk - 0.4% complication rate)       Signed Electronically by: Jorge Conde MD  A copy of this evaluation report is provided to the requesting physician.         "

## 2025-03-06 NOTE — LETTER
3/6/2025    Juan Alberto Menchaca   1975        To Whom it May Concern;    Please excuse Juan Alberto Menchaca from work/school for a healthcare visit on Mar 6, 2025.    Sincerely,        Jorge Conde MD

## 2025-03-06 NOTE — PATIENT INSTRUCTIONS
Patient Education   Preventive Care Advice   This is general advice given by our system to help you stay healthy. However, your care team may have specific advice just for you. Please talk to your care team about your preventive care needs.  Nutrition  Eat 5 or more servings of fruits and vegetables each day.  Try wheat bread, brown rice and whole grain pasta (instead of white bread, rice, and pasta).  Get enough calcium and vitamin D. Check the label on foods and aim for 100% of the RDA (recommended daily allowance).  Lifestyle  Exercise at least 150 minutes each week  (30 minutes a day, 5 days a week).  Do muscle strengthening activities 2 days a week. These help control your weight and prevent disease.  No smoking.  Wear sunscreen to prevent skin cancer.  Have a dental exam and cleaning every 6 months.  Yearly exams  See your health care team every year to talk about:  Any changes in your health.  Any medicines your care team has prescribed.  Preventive care, family planning, and ways to prevent chronic diseases.  Shots (vaccines)   HPV shots (up to age 26), if you've never had them before.  Hepatitis B shots (up to age 59), if you've never had them before.  COVID-19 shot: Get this shot when it's due.  Flu shot: Get a flu shot every year.  Tetanus shot: Get a tetanus shot every 10 years.  Pneumococcal, hepatitis A, and RSV shots: Ask your care team if you need these based on your risk.  Shingles shot (for age 50 and up)  General health tests  Diabetes screening:  Starting at age 35, Get screened for diabetes at least every 3 years.  If you are younger than age 35, ask your care team if you should be screened for diabetes.  Cholesterol test: At age 39, start having a cholesterol test every 5 years, or more often if advised.  Bone density scan (DEXA): At age 50, ask your care team if you should have this scan for osteoporosis (brittle bones).  Hepatitis C: Get tested at least once in your life.  STIs (sexually  transmitted infections)  Before age 24: Ask your care team if you should be screened for STIs.  After age 24: Get screened for STIs if you're at risk. You are at risk for STIs (including HIV) if:  You are sexually active with more than one person.  You don't use condoms every time.  You or a partner was diagnosed with a sexually transmitted infection.  If you are at risk for HIV, ask about PrEP medicine to prevent HIV.  Get tested for HIV at least once in your life, whether you are at risk for HIV or not.  Cancer screening tests  Cervical cancer screening: If you have a cervix, begin getting regular cervical cancer screening tests starting at age 21.  Breast cancer scan (mammogram): If you've ever had breasts, begin having regular mammograms starting at age 40. This is a scan to check for breast cancer.  Colon cancer screening: It is important to start screening for colon cancer at age 45.  Have a colonoscopy test every 10 years (or more often if you're at risk) Or, ask your provider about stool tests like a FIT test every year or Cologuard test every 3 years.  To learn more about your testing options, visit:   .  For help making a decision, visit:   https://bit.ly/lj97151.  Prostate cancer screening test: If you have a prostate, ask your care team if a prostate cancer screening test (PSA) at age 55 is right for you.  Lung cancer screening: If you are a current or former smoker ages 50 to 80, ask your care team if ongoing lung cancer screenings are right for you.  For informational purposes only. Not to replace the advice of your health care provider. Copyright   2023 [facility]. All rights reserved. Clinically reviewed by the [facility] Transitions Program. Traffline 473373 - REV 01/24.  9 Ways to Cut Back on Drinking  Maybe you've found yourself drinking more alcohol than you'd prefer. If you want to cut back, here are some ideas to try.    Think before you drink.  Do you really want a drink,  "or is it just a habit? If you're used to having a drink at a certain time, try doing something else then.     Look for substitutes.  Find some no-alcohol drinks that you enjoy, like flavored seltzer water, tea with honey, or tonic with a slice of lime. Or try alcohol-free beer or \"virgin\" cocktails (without the alcohol).     Drink more water.  Use water to quench your thirst. Drink a glass of water before you have any alcohol. Have another glass along with every drink or between drinks.     Shrink your drink.  For example, have a bottle of beer instead of a pint. Use a smaller glass for wine. Choose drinks with lower alcohol content (ABV%). Or use less liquor and more mixer in cocktails.     Slow down.  It's easy to drink quickly and without thinking about it. Pay attention, and make each drink last longer.     Do the math.  Total up how much you spend on alcohol each month. How much is that a year? If you cut back, what could you do with the money you save?     Take a break.  Choose a day or two each week when you won't drink at all. Notice how you feel on those days, physically and emotionally. How did you sleep? Do you feel better? Over time, add more break days.     Count calories.  Would you like to lose some weight? For some people that's a good motivator for cutting back. Figure out how many calories are in each drink. How many does that add up to in a day? In a week? In a month?     Practice saying no.  Be ready when someone offers you a drink. Try: \"Thanks, I've had enough.\" Or \"Thanks, but I'm cutting back.\" Or \"No, thanks. I feel better when I drink less.\"   Current as of: November 15, 2023  Content Version: 14.3    2024 [employer].   Care instructions adapted under license by your healthcare professional. If you have questions about a medical condition or this instruction, always ask your healthcare professional. [employer] disclaims any warranty or liability for your use of this " information.     How to Take Your Medication Before Surgery  Preoperative Medication Instructions   Antiplatelet or Anticoagulation Medication Instructions   - We reviewed the medication list and the patient is not on an antiplatelet or anticoagulation medications.    Additional Medication Instructions  We reviewed the medication list and there are no chronic medications that need to be adjusted for this procedure.       Preparing for Your Surgery  For Adults  Getting started  In most cases, a nurse will call to review your health history and instructions. They will give you an arrival time based on your scheduled surgery time. Please be ready to share:  Your doctor's clinic name and phone number  Your medical, surgical, and anesthesia history  A list of allergies and sensitivities  A list of medicines, including herbal treatments and over-the-counter drugs  Whether the patient has a legal guardian (ask how to send us the papers in advance)  Note: You may not receive a call if you were seen at our PAC (Preoperative Assessment Center).  Please tell us if you're pregnant--or if there's any chance you might be pregnant. Some surgeries may injure a fetus (unborn baby), so they require a pregnancy test. Surgeries that are safe for a fetus don't always need a test, and you can choose whether to have one.   Preparing for surgery  Within 10 to 30 days of surgery: Have a pre-op exam (sometimes called an H&P, or History and Physical). This can be done at a clinic or pre-operative center.  If you're having a , you may not need this exam. Talk to your care team.  At your pre-op exam, talk to your care team about all medicines you take. (This includes CBD oil and any drugs, such as THC, marijuana, and other forms of cannabis.) If you need to stop any medicine before surgery, ask when to start taking it again.  This is for your safety. Many medicines and drugs can make you bleed too much during surgery. Some change how  well surgery (anesthesia) drugs work.  Call your insurance company to let them know you're having surgery. (If you don't have insurance, call [phone].)  Call your clinic if there's any change in your health. This includes a scrape or scratch near the surgery site, or any signs of a cold (sore throat, runny nose, cough, rash, fever).  Eating and drinking guidelines  For your safety: Unless your surgeon tells you otherwise, follow the guidelines below.  Eat and drink as normal until 8 hours before you arrive for surgery. After that, no food or milk. You can spit out gum when you arrive.  Drink clear liquids until 2 hours before you arrive. These are liquids you can see through, like water, Gatorade, and Propel Water. They also include plain black coffee and tea (no cream or milk).  No alcohol for 24 hours before you arrive. The night before surgery, stop any drinks that contain THC.  If your care team tells you to take medicine on the morning of surgery, it's okay to take it with a sip of water. No other medicines or drugs are allowed (including CBD oil)--follow your care team's instructions.  If you have questions the day of surgery, call your hospital or surgery center.   Preventing infection  Shower or bathe the night before and the morning of surgery. Follow the instructions your clinic gave you. (If no instructions, use regular soap.)  Don't shave or clip hair near your surgery site. We'll remove the hair if needed.  Don't smoke or vape the morning of surgery. No chewing tobacco for 6 hours before you arrive. A nicotine patch is okay. You may spit out nicotine gum when you arrive.  For some surgeries, the surgeon will tell you to fully quit smoking and nicotine.  We will make every effort to keep you safe from infection. We will:  Clean our hands often with soap and water (or an alcohol-based hand rub).  Clean the skin at your surgery site with a special soap that kills germs.  Give you a special gown to  keep you warm. (Cold raises the risk of infection.)  Wear hair covers, masks, gowns, and gloves during surgery.  Give antibiotic medicine, if prescribed. Not all surgeries need this medicine.  What to bring on the day of surgery  Photo ID and insurance card  Copy of your health care directive, if you have one  Glasses and hearing aids (bring cases)  You can't wear contacts during surgery  Inhaler and eye drops, if you use them (tell us about these when you arrive)  CPAP machine or breathing device, if you use them  A few personal items, if spending the night  If you have . . .  A pacemaker, ICD (cardiac defibrillator), or other implant: Bring the ID card.  An implanted stimulator: Bring the remote control.  A legal guardian: Bring a copy of the certified (court-stamped) guardianship papers.  Please remove any jewelry, including body piercings. Leave jewelry and other valuables at home.  If you're going home the day of surgery  You must have a responsible adult drive you home. They should stay with you overnight as well.  If you don't have someone to stay with you, and you aren't safe to go home alone, we may keep you overnight. Insurance often won't pay for this.  After surgery  If it's hard to control your pain or you need more pain medicine, please call your surgeon's office.  Questions?   If you have any questions for your care team, list them here:   ____________________________________________________________________________________________________________________________________________________________________________________________________________________________________________________________  For informational purposes only. Not to replace the advice of your health care provider. Copyright   2003, 2019 [facility]. All rights reserved. Clinically reviewed by Parveen Heredia MD. SMARTworks [id number] - REV 08/24.

## 2025-03-07 LAB
CHOLEST SERPL-MCNC: 264 MG/DL
FASTING STATUS PATIENT QL REPORTED: YES
HDLC SERPL-MCNC: 47 MG/DL
LDLC SERPL CALC-MCNC: 196 MG/DL
NONHDLC SERPL-MCNC: 217 MG/DL
TRIGL SERPL-MCNC: 107 MG/DL